# Patient Record
Sex: FEMALE | Race: OTHER | HISPANIC OR LATINO | URBAN - METROPOLITAN AREA
[De-identification: names, ages, dates, MRNs, and addresses within clinical notes are randomized per-mention and may not be internally consistent; named-entity substitution may affect disease eponyms.]

---

## 2019-04-01 PROBLEM — Z00.00 ENCOUNTER FOR PREVENTIVE HEALTH EXAMINATION: Status: ACTIVE | Noted: 2019-04-01

## 2022-10-26 ENCOUNTER — INPATIENT (INPATIENT)
Facility: HOSPITAL | Age: 82
LOS: 6 days | Discharge: HOME HEALTH | DRG: 247 | End: 2022-11-02
Attending: INTERNAL MEDICINE | Admitting: INTERNAL MEDICINE
Payer: MEDICARE

## 2022-10-26 VITALS
DIASTOLIC BLOOD PRESSURE: 77 MMHG | TEMPERATURE: 98 F | SYSTOLIC BLOOD PRESSURE: 144 MMHG | RESPIRATION RATE: 18 BRPM | HEART RATE: 96 BPM | OXYGEN SATURATION: 98 %

## 2022-10-26 LAB
APPEARANCE UR: CLEAR — SIGNIFICANT CHANGE UP
BILIRUB UR-MCNC: NEGATIVE — SIGNIFICANT CHANGE UP
COLOR SPEC: YELLOW — SIGNIFICANT CHANGE UP
DIFF PNL FLD: NEGATIVE — SIGNIFICANT CHANGE UP
GLUCOSE UR QL: NEGATIVE — SIGNIFICANT CHANGE UP
KETONES UR-MCNC: NEGATIVE — SIGNIFICANT CHANGE UP
LEUKOCYTE ESTERASE UR-ACNC: NEGATIVE — SIGNIFICANT CHANGE UP
NITRITE UR-MCNC: NEGATIVE — SIGNIFICANT CHANGE UP
PH UR: 7 — SIGNIFICANT CHANGE UP (ref 5–8)
PROT UR-MCNC: NEGATIVE MG/DL — SIGNIFICANT CHANGE UP
SP GR SPEC: 1.01 — SIGNIFICANT CHANGE UP (ref 1–1.03)
UROBILINOGEN FLD QL: 0.2 E.U./DL — SIGNIFICANT CHANGE UP

## 2022-10-26 PROCEDURE — 71045 X-RAY EXAM CHEST 1 VIEW: CPT | Mod: 26

## 2022-10-26 PROCEDURE — 99285 EMERGENCY DEPT VISIT HI MDM: CPT

## 2022-10-26 PROCEDURE — 93010 ELECTROCARDIOGRAM REPORT: CPT

## 2022-10-26 RX ORDER — ASPIRIN/CALCIUM CARB/MAGNESIUM 324 MG
162 TABLET ORAL ONCE
Refills: 0 | Status: COMPLETED | OUTPATIENT
Start: 2022-10-26 | End: 2022-10-26

## 2022-10-26 RX ORDER — SODIUM CHLORIDE 9 MG/ML
1000 INJECTION INTRAMUSCULAR; INTRAVENOUS; SUBCUTANEOUS ONCE
Refills: 0 | Status: COMPLETED | OUTPATIENT
Start: 2022-10-26 | End: 2022-10-26

## 2022-10-26 RX ADMIN — Medication 162 MILLIGRAM(S): at 23:22

## 2022-10-26 RX ADMIN — SODIUM CHLORIDE 1000 MILLILITER(S): 9 INJECTION INTRAMUSCULAR; INTRAVENOUS; SUBCUTANEOUS at 22:59

## 2022-10-26 NOTE — ED ADULT NURSE NOTE - OBJECTIVE STATEMENT
Pt presents to ER c/o sudden oneset of CP with shortness of breath, palpitations and lightheadedness that started today at 5pm.

## 2022-10-26 NOTE — ED PROVIDER NOTE - CLINICAL SUMMARY MEDICAL DECISION MAKING FREE TEXT BOX
81 yo female with a hx fo HTN, HLD, T2DM p/w chest pain and shortness of breath that started today at 5pm. Tachycardic on arrival. Exam showed b/l edema R>L. EKG RBBB with no prior for comparison (patient says she was never told her EKG is abnormal). Work up for ACS, PE, pneumothorax. No clinical pneumonia. R/o DVT in RLE.

## 2022-10-26 NOTE — ED PROVIDER NOTE - PROGRESS NOTE DETAILS
Trop negative. Age adjusted d-dimer negative. Still having chest pain. Pending XR. XR wet read negative. HEART score 7. Admit to cardiology for unstable angina.

## 2022-10-26 NOTE — ED PROVIDER NOTE - OBJECTIVE STATEMENT
83 yo female with a hx fo HTN, HLD, T2DM p/w chest pain and shortness of breath that started today at 5pm. Says she was feeling well last night. Associated with mild lightheadedness and palpitations. Worse with exertion, midsternal pressure like moderate chest pain without alleviating factors. Never had it before. No fevers, chills, cough, rhinorrhea, congestion, myalgias, abdominal pain, changes in appetite, n/v/d/c, diaphoresis. Reports chronic b/l leg swelling R>L x years but she has been having calf pain on R for the past 2-3 weeks. No hx of DVT/PE. Not on blood thinners. Not on hormone therapy. No hx of active malignancy.

## 2022-10-27 ENCOUNTER — TRANSCRIPTION ENCOUNTER (OUTPATIENT)
Age: 82
End: 2022-10-27

## 2022-10-27 DIAGNOSIS — E87.1 HYPO-OSMOLALITY AND HYPONATREMIA: ICD-10-CM

## 2022-10-27 DIAGNOSIS — E11.9 TYPE 2 DIABETES MELLITUS WITHOUT COMPLICATIONS: ICD-10-CM

## 2022-10-27 DIAGNOSIS — G62.9 POLYNEUROPATHY, UNSPECIFIED: ICD-10-CM

## 2022-10-27 DIAGNOSIS — I20.0 UNSTABLE ANGINA: ICD-10-CM

## 2022-10-27 DIAGNOSIS — Z29.9 ENCOUNTER FOR PROPHYLACTIC MEASURES, UNSPECIFIED: ICD-10-CM

## 2022-10-27 DIAGNOSIS — I10 ESSENTIAL (PRIMARY) HYPERTENSION: ICD-10-CM

## 2022-10-27 DIAGNOSIS — R22.41 LOCALIZED SWELLING, MASS AND LUMP, RIGHT LOWER LIMB: ICD-10-CM

## 2022-10-27 DIAGNOSIS — E78.5 HYPERLIPIDEMIA, UNSPECIFIED: ICD-10-CM

## 2022-10-27 LAB
A1C WITH ESTIMATED AVERAGE GLUCOSE RESULT: 6.7 % — HIGH (ref 4–5.6)
ALBUMIN SERPL ELPH-MCNC: 3.6 G/DL — SIGNIFICANT CHANGE UP (ref 3.3–5)
ALP SERPL-CCNC: 69 U/L — SIGNIFICANT CHANGE UP (ref 40–120)
ALT FLD-CCNC: 29 U/L — SIGNIFICANT CHANGE UP (ref 10–45)
ANION GAP SERPL CALC-SCNC: 12 MMOL/L — SIGNIFICANT CHANGE UP (ref 5–17)
APTT BLD: 30.6 SEC — SIGNIFICANT CHANGE UP (ref 27.5–35.5)
AST SERPL-CCNC: 23 U/L — SIGNIFICANT CHANGE UP (ref 10–40)
BASOPHILS # BLD AUTO: 0.05 K/UL — SIGNIFICANT CHANGE UP (ref 0–0.2)
BASOPHILS NFR BLD AUTO: 0.5 % — SIGNIFICANT CHANGE UP (ref 0–2)
BILIRUB DIRECT SERPL-MCNC: 0.2 MG/DL — SIGNIFICANT CHANGE UP (ref 0–0.3)
BILIRUB INDIRECT FLD-MCNC: 0.3 MG/DL — SIGNIFICANT CHANGE UP (ref 0.2–1)
BILIRUB SERPL-MCNC: 0.5 MG/DL — SIGNIFICANT CHANGE UP (ref 0.2–1.2)
BUN SERPL-MCNC: 4 MG/DL — LOW (ref 7–23)
CALCIUM SERPL-MCNC: 9.7 MG/DL — SIGNIFICANT CHANGE UP (ref 8.4–10.5)
CHLORIDE SERPL-SCNC: 92 MMOL/L — LOW (ref 96–108)
CHOLEST SERPL-MCNC: 111 MG/DL — SIGNIFICANT CHANGE UP
CK MB CFR SERPL CALC: 1.9 NG/ML — SIGNIFICANT CHANGE UP (ref 0–6.7)
CK MB CFR SERPL CALC: 2 NG/ML — SIGNIFICANT CHANGE UP (ref 0–6.7)
CK SERPL-CCNC: 42 U/L — SIGNIFICANT CHANGE UP (ref 25–170)
CK SERPL-CCNC: 47 U/L — SIGNIFICANT CHANGE UP (ref 25–170)
CO2 SERPL-SCNC: 26 MMOL/L — SIGNIFICANT CHANGE UP (ref 22–31)
CREAT SERPL-MCNC: 0.41 MG/DL — LOW (ref 0.5–1.3)
EGFR: 99 ML/MIN/1.73M2 — SIGNIFICANT CHANGE UP
EOSINOPHIL # BLD AUTO: 0.07 K/UL — SIGNIFICANT CHANGE UP (ref 0–0.5)
EOSINOPHIL NFR BLD AUTO: 0.8 % — SIGNIFICANT CHANGE UP (ref 0–6)
ESTIMATED AVERAGE GLUCOSE: 146 MG/DL — HIGH (ref 68–114)
GLUCOSE BLDC GLUCOMTR-MCNC: 150 MG/DL — HIGH (ref 70–99)
GLUCOSE BLDC GLUCOMTR-MCNC: 153 MG/DL — HIGH (ref 70–99)
GLUCOSE BLDC GLUCOMTR-MCNC: 158 MG/DL — HIGH (ref 70–99)
GLUCOSE BLDC GLUCOMTR-MCNC: 185 MG/DL — HIGH (ref 70–99)
GLUCOSE SERPL-MCNC: 195 MG/DL — HIGH (ref 70–99)
HCT VFR BLD CALC: 36.3 % — SIGNIFICANT CHANGE UP (ref 34.5–45)
HDLC SERPL-MCNC: 48 MG/DL — LOW
HGB BLD-MCNC: 12.5 G/DL — SIGNIFICANT CHANGE UP (ref 11.5–15.5)
IMM GRANULOCYTES NFR BLD AUTO: 1 % — HIGH (ref 0–0.9)
INR BLD: 1.04 — SIGNIFICANT CHANGE UP (ref 0.88–1.16)
LIPID PNL WITH DIRECT LDL SERPL: 50 MG/DL — SIGNIFICANT CHANGE UP
LYMPHOCYTES # BLD AUTO: 1.95 K/UL — SIGNIFICANT CHANGE UP (ref 1–3.3)
LYMPHOCYTES # BLD AUTO: 21.4 % — SIGNIFICANT CHANGE UP (ref 13–44)
MAGNESIUM SERPL-MCNC: 1.5 MG/DL — LOW (ref 1.6–2.6)
MCHC RBC-ENTMCNC: 28.6 PG — SIGNIFICANT CHANGE UP (ref 27–34)
MCHC RBC-ENTMCNC: 34.4 GM/DL — SIGNIFICANT CHANGE UP (ref 32–36)
MCV RBC AUTO: 83.1 FL — SIGNIFICANT CHANGE UP (ref 80–100)
MONOCYTES # BLD AUTO: 0.85 K/UL — SIGNIFICANT CHANGE UP (ref 0–0.9)
MONOCYTES NFR BLD AUTO: 9.3 % — SIGNIFICANT CHANGE UP (ref 2–14)
NEUTROPHILS # BLD AUTO: 6.12 K/UL — SIGNIFICANT CHANGE UP (ref 1.8–7.4)
NEUTROPHILS NFR BLD AUTO: 67 % — SIGNIFICANT CHANGE UP (ref 43–77)
NON HDL CHOLESTEROL: 63 MG/DL — SIGNIFICANT CHANGE UP
NRBC # BLD: 0 /100 WBCS — SIGNIFICANT CHANGE UP (ref 0–0)
PLATELET # BLD AUTO: 308 K/UL — SIGNIFICANT CHANGE UP (ref 150–400)
POTASSIUM SERPL-MCNC: 3.8 MMOL/L — SIGNIFICANT CHANGE UP (ref 3.5–5.3)
POTASSIUM SERPL-SCNC: 3.8 MMOL/L — SIGNIFICANT CHANGE UP (ref 3.5–5.3)
PROT SERPL-MCNC: 7.4 G/DL — SIGNIFICANT CHANGE UP (ref 6–8.3)
PROTHROM AB SERPL-ACNC: 12.4 SEC — SIGNIFICANT CHANGE UP (ref 10.5–13.4)
RBC # BLD: 4.37 M/UL — SIGNIFICANT CHANGE UP (ref 3.8–5.2)
RBC # FLD: 13.4 % — SIGNIFICANT CHANGE UP (ref 10.3–14.5)
SODIUM SERPL-SCNC: 130 MMOL/L — LOW (ref 135–145)
TRIGL SERPL-MCNC: 64 MG/DL — SIGNIFICANT CHANGE UP
TROPONIN T SERPL-MCNC: 0.01 NG/ML — SIGNIFICANT CHANGE UP (ref 0–0.01)
TROPONIN T SERPL-MCNC: <0.01 NG/ML — SIGNIFICANT CHANGE UP (ref 0–0.01)
TSH SERPL-MCNC: 0.74 UIU/ML — SIGNIFICANT CHANGE UP (ref 0.27–4.2)
WBC # BLD: 9.13 K/UL — SIGNIFICANT CHANGE UP (ref 3.8–10.5)
WBC # FLD AUTO: 9.13 K/UL — SIGNIFICANT CHANGE UP (ref 3.8–10.5)

## 2022-10-27 PROCEDURE — 93306 TTE W/DOPPLER COMPLETE: CPT | Mod: 26

## 2022-10-27 PROCEDURE — 93970 EXTREMITY STUDY: CPT | Mod: 26

## 2022-10-27 PROCEDURE — 99222 1ST HOSP IP/OBS MODERATE 55: CPT

## 2022-10-27 RX ORDER — GLUCAGON INJECTION, SOLUTION 0.5 MG/.1ML
1 INJECTION, SOLUTION SUBCUTANEOUS ONCE
Refills: 0 | Status: DISCONTINUED | OUTPATIENT
Start: 2022-10-27 | End: 2022-11-02

## 2022-10-27 RX ORDER — DEXTROSE 50 % IN WATER 50 %
12.5 SYRINGE (ML) INTRAVENOUS ONCE
Refills: 0 | Status: DISCONTINUED | OUTPATIENT
Start: 2022-10-27 | End: 2022-11-02

## 2022-10-27 RX ORDER — MAGNESIUM OXIDE 400 MG ORAL TABLET 241.3 MG
400 TABLET ORAL ONCE
Refills: 0 | Status: COMPLETED | OUTPATIENT
Start: 2022-10-27 | End: 2022-10-27

## 2022-10-27 RX ORDER — DEXTROSE 50 % IN WATER 50 %
15 SYRINGE (ML) INTRAVENOUS ONCE
Refills: 0 | Status: DISCONTINUED | OUTPATIENT
Start: 2022-10-27 | End: 2022-11-02

## 2022-10-27 RX ORDER — SODIUM CHLORIDE 9 MG/ML
1000 INJECTION, SOLUTION INTRAVENOUS
Refills: 0 | Status: DISCONTINUED | OUTPATIENT
Start: 2022-10-27 | End: 2022-11-02

## 2022-10-27 RX ORDER — AMLODIPINE BESYLATE 2.5 MG/1
5 TABLET ORAL DAILY
Refills: 0 | Status: DISCONTINUED | OUTPATIENT
Start: 2022-10-27 | End: 2022-11-02

## 2022-10-27 RX ORDER — DEXTROSE 50 % IN WATER 50 %
25 SYRINGE (ML) INTRAVENOUS ONCE
Refills: 0 | Status: DISCONTINUED | OUTPATIENT
Start: 2022-10-27 | End: 2022-11-02

## 2022-10-27 RX ORDER — POTASSIUM CHLORIDE 20 MEQ
20 PACKET (EA) ORAL ONCE
Refills: 0 | Status: COMPLETED | OUTPATIENT
Start: 2022-10-27 | End: 2022-10-27

## 2022-10-27 RX ORDER — METFORMIN HYDROCHLORIDE 850 MG/1
1 TABLET ORAL
Qty: 0 | Refills: 0 | DISCHARGE

## 2022-10-27 RX ORDER — INSULIN LISPRO 100/ML
VIAL (ML) SUBCUTANEOUS
Refills: 0 | Status: DISCONTINUED | OUTPATIENT
Start: 2022-10-27 | End: 2022-11-02

## 2022-10-27 RX ORDER — METOPROLOL TARTRATE 50 MG
25 TABLET ORAL DAILY
Refills: 0 | Status: DISCONTINUED | OUTPATIENT
Start: 2022-10-27 | End: 2022-11-02

## 2022-10-27 RX ORDER — GLIMEPIRIDE 1 MG
1 TABLET ORAL
Qty: 0 | Refills: 0 | DISCHARGE

## 2022-10-27 RX ORDER — ATORVASTATIN CALCIUM 80 MG/1
20 TABLET, FILM COATED ORAL AT BEDTIME
Refills: 0 | Status: DISCONTINUED | OUTPATIENT
Start: 2022-10-27 | End: 2022-11-01

## 2022-10-27 RX ORDER — HYDROCHLOROTHIAZIDE 25 MG
12.5 TABLET ORAL DAILY
Refills: 0 | Status: DISCONTINUED | OUTPATIENT
Start: 2022-10-27 | End: 2022-10-29

## 2022-10-27 RX ORDER — INFLUENZA VIRUS VACCINE 15; 15; 15; 15 UG/.5ML; UG/.5ML; UG/.5ML; UG/.5ML
0.7 SUSPENSION INTRAMUSCULAR ONCE
Refills: 0 | Status: DISCONTINUED | OUTPATIENT
Start: 2022-10-27 | End: 2022-11-02

## 2022-10-27 RX ORDER — ASPIRIN/CALCIUM CARB/MAGNESIUM 324 MG
81 TABLET ORAL DAILY
Refills: 0 | Status: DISCONTINUED | OUTPATIENT
Start: 2022-10-27 | End: 2022-11-02

## 2022-10-27 RX ORDER — METOPROLOL TARTRATE 50 MG
50 TABLET ORAL ONCE
Refills: 0 | Status: COMPLETED | OUTPATIENT
Start: 2022-10-27 | End: 2022-10-27

## 2022-10-27 RX ORDER — LOSARTAN POTASSIUM 100 MG/1
100 TABLET, FILM COATED ORAL DAILY
Refills: 0 | Status: DISCONTINUED | OUTPATIENT
Start: 2022-10-27 | End: 2022-11-02

## 2022-10-27 RX ADMIN — Medication 2: at 07:04

## 2022-10-27 RX ADMIN — MAGNESIUM OXIDE 400 MG ORAL TABLET 400 MILLIGRAM(S): 241.3 TABLET ORAL at 18:30

## 2022-10-27 RX ADMIN — AMLODIPINE BESYLATE 5 MILLIGRAM(S): 2.5 TABLET ORAL at 06:33

## 2022-10-27 RX ADMIN — Medication 2: at 23:18

## 2022-10-27 RX ADMIN — Medication 2: at 16:25

## 2022-10-27 RX ADMIN — Medication 12.5 MILLIGRAM(S): at 06:58

## 2022-10-27 RX ADMIN — Medication 25 MILLIGRAM(S): at 06:34

## 2022-10-27 RX ADMIN — Medication 20 MILLIEQUIVALENT(S): at 18:30

## 2022-10-27 RX ADMIN — Medication 81 MILLIGRAM(S): at 14:00

## 2022-10-27 RX ADMIN — LOSARTAN POTASSIUM 100 MILLIGRAM(S): 100 TABLET, FILM COATED ORAL at 06:33

## 2022-10-27 NOTE — H&P ADULT - NSICDXPASTMEDICALHX_GEN_ALL_CORE_FT
PAST MEDICAL HISTORY:  DM2 (diabetes mellitus, type 2)     Essential hypertension     Hyperlipidemia      PAST MEDICAL HISTORY:  DM2 (diabetes mellitus, type 2)     Essential hypertension     H/O peripheral neuropathy     Hyperlipidemia

## 2022-10-27 NOTE — H&P ADULT - NSHPPHYSICALEXAM_GEN_ALL_CORE
Temp 98.6F, HR 70bpm, /80, RR 16, O2 sat 98% RA, Weight 200lbs, Height 5' 6"    T(C): 36.8 (10-26-22 @ 20:46), Max: 36.8 (10-26-22 @ 20:46)  HR: 110 (10-26-22 @ 22:13) (96 - 110)  BP: 144/77 (10-26-22 @ 20:46) (144/77 - 144/77)  RR: 18 (10-26-22 @ 22:13) (18 - 18)  SpO2: 96% (10-26-22 @ 22:13) (96% - 98%)  Wt(kg): --    Appearance: Normal	  HEENT:   Normal oral mucosa, PERRL, EOMI	  Neck: Supple,  - JVD; No Carotid Bruit and 2+ pulses B/L  Cardiovascular: Normal S1 S2, No JVD, No murmurs  Respiratory: Lungs clear to auscultation/No Rales, Rhonchi, Wheezing	  Gastrointestinal:  Soft, Non-tender, + BS	  Skin: No rashes, No ecchymoses, No cyanosis  Extremities: Normal range of motion, No clubbing, cyanosis or  b/l edema R>L  Vascular: Femoral pulses 2+ b/l without bruit, DP 1+ b/l, PT 1+ b/l  Neurologic: Non-focal  Psychiatry: A & O x 3, Mood & affect appropriate T(C): 36.8 (10-26-22 @ 20:46), Max: 36.8 (10-26-22 @ 20:46)  HR: 110 (10-26-22 @ 22:13) (96 - 110)  BP: 144/77 (10-26-22 @ 20:46) (144/77 - 144/77)  RR: 18 (10-26-22 @ 22:13) (18 - 18)  SpO2: 96% (10-26-22 @ 22:13) (96% - 98%)  Wt(kg): --    Appearance: Normal	  HEENT:   Normal oral mucosa, PERRL, EOMI	  Neck: Supple,  - JVD; No Carotid Bruit and 2+ pulses B/L  Cardiovascular: Normal S1 S2, No JVD, No murmurs  Respiratory: Lungs mild crackles at left base; otherwise CTA	  Gastrointestinal:  Soft, Non-tender, + BS	  Skin: No rashes, No ecchymoses, No cyanosis  Extremities: Normal range of motion, No clubbing, cyanosis or  b/l edema R>L +1-2 pitting edema  Vascular: Femoral pulses 2+ b/l without bruit, DP 2+ b/l, PT 1+ b/l  Neurologic: Non-focal  Psychiatry: A & O x 3, Mood & affect appropriate

## 2022-10-27 NOTE — H&P ADULT - NS ATTEND AMEND GEN_ALL_CORE FT
Initial attending contact date 10.27.22 on morning bedside rounds. See attending addendum to HPI here for initial attending contact documentation.  82F with hx DM2, HLD, HTN p/w chest pain, palpitations. ECG RBBB. D dimer was negative. Serial troponin negative. Admit for ACS r/o  Patient initially amenable for NST today 10/27, however upon being taken for examination patient declined to proceed with stress testing. Patient is amenable to CCTA which can be done tomorrow.  TTE performd 10/27 normal findings  Plan for:  NPO pMN for CCTA 10/28  Cont home meds:  Losartan 100, HCTZ 25, Amlodipine 5 daily for BP control  Toprol 25XL daily  Atorva 20 qHS  PT evaluation of deconditioning  Christiana Corrigan M.D.  Cardiology Attending  65 minutes spent on total encounter; more than 50% of the visit was spent counseling and/or coordinating care by the attending physician, with plan of care discussed with the patient and cardiac team.

## 2022-10-27 NOTE — DISCHARGE NOTE PROVIDER - NSDCCPCAREPLAN_GEN_ALL_CORE_FT
PRINCIPAL DISCHARGE DIAGNOSIS  Diagnosis: Unstable angina  Assessment and Plan of Treatment: You were found to have blockages in the arteries of your heart, also known as Coronary Artery Disease. You underwent a cardiac catheterization on 11/1 and received a stent to the Obtuse marginal artery.  PLEASE CONTINUE ASPIRIN 81MG DAILY AND PLAVIX 75MG DAILY. DO NOT STOP THESE MEDICATIONS FOR ANY REASON AS THEY ARE KEEPING YOUR STENT OPEN AND PREVENTING A HEART ATTACK.   Avoid strenuous activity or heavy lifting anything more than 5lbs for the next five days. Do not take a bath or swim for the next five days; you may shower. For any bleeding or hematoma formation (hardened blood collection under the skin) at the access site of your keene wrist please hold pressure and go to the emergency room. Please follow up with  ___ in 1-2 weeks. For recurrent chest pain, please call your doctor or go to the emergency room.   - We have provided you with a prescription for cardiac rehab which is a medically supervised exercise program for your heart and has been shown to improve the quantity and quality of life of people with heart disease like yours.   - You should attend cardiac rehab 3 times per week for 12 weeks.   - We have provided you with a list of nearby facilities.   -Please call your insurance carrier to determine which of these facilities are covered under your plan.   - Please bring this prescription with you to your follow up appointment with your cardiologist who can then further assist you to enroll into a cardiac rehab program.       PRINCIPAL DISCHARGE DIAGNOSIS  Diagnosis: Unstable angina  Assessment and Plan of Treatment: You were found to have blockages in the arteries of your heart, also known as Coronary Artery Disease. You underwent a cardiac catheterization on 11/1 and received a stent to the Obtuse marginal artery.  PLEASE CONTINUE ASPIRIN 81MG DAILY AND PLAVIX 75MG DAILY. DO NOT STOP THESE MEDICATIONS FOR ANY REASON AS THEY ARE KEEPING YOUR STENT OPEN AND PREVENTING A HEART ATTACK.   Avoid strenuous activity or heavy lifting anything more than 5lbs for the next five days. Do not take a bath or swim for the next five days; you may shower. For any bleeding or hematoma formation (hardened blood collection under the skin) at the access site of your keene wrist please hold pressure and go to the emergency room. Please follow up with  ___ in 1-2 weeks. For recurrent chest pain, please call your doctor or go to the emergency room.       PRINCIPAL DISCHARGE DIAGNOSIS  Diagnosis: Unstable angina  Assessment and Plan of Treatment: You were found to have blockages in the arteries of your heart, also known as Coronary Artery Disease. You underwent a cardiac catheterization on 11/1 and received a stent to the Obtuse marginal artery.  PLEASE CONTINUE ASPIRIN 81MG DAILY AND PLAVIX 75MG DAILY. DO NOT STOP THESE MEDICATIONS FOR ANY REASON AS THEY ARE KEEPING YOUR STENT OPEN AND PREVENTING A HEART ATTACK.   Avoid strenuous activity or heavy lifting anything more than 5lbs for the next five days. Do not take a bath or swim for the next five days; you may shower. For any bleeding or hematoma formation (hardened blood collection under the skin) at the access site of your right wrist please hold pressure and go to the emergency room. Please follow up with Dr. Berry in 1-2 weeks. For recurrent chest pain, please call your doctor or go to the emergency room.      SECONDARY DISCHARGE DIAGNOSES  Diagnosis: Essential hypertension  Assessment and Plan of Treatment: Please continue Losartan 100mg daily, Metoprolol Succinate ER 25mg daily, and Amlodipine has been increased to 10mg daily to keep your blood pressure controlled. For blood pressure that is too high or too low please see your doctor or go to the emergency room as necessary.      Diagnosis: DM2 (diabetes mellitus, type 2)  Assessment and Plan of Treatment: Your Hemoglobin A1c is 6.7 and your goal A1c is less than 7.0%. This number measures your average blood sugar level over the last three months.  DO NOT TAKE your Metformin for two days and resume on 11/4/2022. This medication can interact with the contrast used during your procedure therefore we want to ensure the contrast has left your body prior to you restarting your Metformin.   Make sure you monitor your finger sticks and diet while you are not taking your Metformin!      Diagnosis: Hyperlipidemia  Assessment and Plan of Treatment: Your cholesterol panel is abnormal. Your LDL is (INSERT) mg/dL and your goal LDL is less than 70 mg/dL. LDL is also known as "bad cholesterol" because it takes cholesterol to your arteries, where it may collect in artery walls. Too much cholesterol in your arteries may lead to a buildup of plaque known as atherosclerosis and can cause heart disease. Your HDL is (INSERT) mg/dL and your goal HDL is greater than 50 mg/dL. The higher the HDL the better; HDL is known as “good cholesterol” because it transports cholesterol to your liver to be expelled from your body.    Diagnosis: Phlebitis  Assessment and Plan of Treatment: During your hospital course you had a fever and White blood count (monitor infections) was mildly elevated and infectious work up was negative. Please follow up with your primary care doctor for repeat blood work.

## 2022-10-27 NOTE — H&P ADULT - HISTORY OF PRESENT ILLNESS
A&O X3 Full Code A&O X3 Full Code  Daughter-in-law at bedside interpreting, reliable source    83 y/o Welsh speaking female, who ambulates with walker, POOR HISTORIAN with PMHX of HTN, HLD, DM2,and  Peripheral Neuropathy presents to [facility] ER this evening, 10/26/22, accompanied by son and daugher-in-law complaining of intermittent, non exertional, non radiating, midsternal chest pressure  associated with weakness, lightheadedness, SOB and palpitations that began at 5PM this evening.   Pt. also endorses R>L LE edema with right leg "heaviness" with mild calf pain for one month.    In speaking with patient, she reports sudden intermittent, non exertional, non  radiating midsternal chest pressure occurring around 5PM while sitting down and worsening with ambulation.  She describes chest pressure as "heavy", rated 6/10, lasting 2-3 minutes associated with weakness, SOB and palpitations.   Pt. states symptoms are worse with exertion and without alleviating factors.  According to patient, she was feeling well last night and denies similar  symptoms in the past.    She also endorses difficulty walking for the past month 2/2 to right leg heaviness with b/l LE R>L swelling.   Pt. denies cardiac hx, recent stress test and TTE.    No recent travel, fever, chills, cough HA, orthopnea, dizziness, diaphoresis, abdominal discomfort and n/v/d.      In ER ECG reveals Sinus Tachy HR@ 105bpm with PAC's , RBBB and non specific ST-T wave changes,  CE neg X2, , D-dimer 318, Blood Glucose 221, BUN/Cr 4/0.44 (GFR 98) COVID-19 non detected and CXR AP no acute pathology seen.    In light of patient's risk factors, and symptoms (UA) pt. is admitted to [facility], [address] for further cardiac workup to rule out ACS, with TTE and b/l LE Venous US to rule out DVT.

## 2022-10-27 NOTE — H&P ADULT - PROBLEM SELECTOR PLAN 5
F/U Hgb A1C  -Hold patient's oral hyperglycemic medications Metformin 850mg PO bid and Glimepride 2mg PO daily  -Continue Insulin Lispro Med dose sliding scale

## 2022-10-27 NOTE — PROGRESS NOTE ADULT - ASSESSMENT
81 y/o Maltese speaking female, who ambulates with walker, POOR HISTORIAN with PMHX of HTN, HLD, DM2,and  Peripheral Neuropathy presents to [facility] ER 10/26/22, accompanied by son and daugher-in-law complaining of intermittent, non exertional, non radiating, midsternal chest pressure associated with weakness, lightheadedness, SOB and palpitations. Pt is admitted to tele/stepdown to r/o ACS.

## 2022-10-27 NOTE — DISCHARGE NOTE PROVIDER - HOSPITAL COURSE
INCOMPLETE 83 y/o French speaking female, who ambulates with walker, POOR HISTORIAN with PMHX of HTN, HLD, DM2,and  Peripheral Neuropathy presents to [facility] ER this evening, 10/26/22, accompanied by son and daugher-in-law complaining of intermittent, non exertional, non radiating, midsternal chest pressure  associated with weakness, lightheadedness, SOB and palpitations that began at 5PM this evening.   Pt. also endorses R>L LE edema with right leg "heaviness" with mild calf pain for one month. In speaking with patient, she reports sudden intermittent, non exertional, non  radiating midsternal chest pressure occurring around 5PM while sitting down and worsening with ambulation.  She describes chest pressure as "heavy", rated 6/10, lasting 2-3 minutes associated with weakness, SOB and palpitations.   Pt. states symptoms are worse with exertion and without alleviating factors.  According to patient, she was feeling well last night and denies similar  symptoms in the past.    She also endorses difficulty walking for the past month 2/2 to right leg heaviness with b/l LE R>L swelling. No recent travel, fever, chills, cough HA, orthopnea, dizziness, diaphoresis, abdominal discomfort and n/v/d.  In ER ECG reveals Sinus Tachy HR@ 105bpm with PAC's RBBB and non specific ST-T wave changes,  CE neg X2, , D-dimer 318, Blood Glucose 221, BUN/Cr 4/0.44 (GFR 98) COVID-19 non detected and CXR AP no acute pathology seen. .Pt was admitted to [facility], [facility] for further cardiac workup to rule out ACS, with TTE and b/l LE Venous US to rule out DVT.    Pt was admitted to [facility] and underwent ischemic workup with CCTA 10/28 showing The calcium score is severe at 660 Agatston units, which is at the 84 percentile, adjusted for age, gender and race. 2. Moderate to severe OM1 stenosis due to calcific and non-calcific plaque 3. Probably nonobstructive RPDA showing Nonobstructive coronary artery disease in remaining segments. ECHO was done 10/27 LVEF Normal right ventricular size and systolic function. Aortic sclerosis without significant stenosis.  No evidence of pulmonary hypertension. pericardial effusion No prior echo is available for comparison. On 10/27 Patient underwent Duplex US of LE with no evidence of DVT.  Patient then was planned for cardiac cath on 10/31 however on morning of cath patient spiked a fever and full set of labs were done with BCX negative to date, WBC WNL, UA negative and with most likely cause phlebitis due to IV. Patient therefore underwent cath on 11/1/2022 with Sneha Vanessa/ MINE CAMACHO TR Access 1vCAD with OM1 80% --> s/p CHEIKH x 1 EDP 12, nl EF.     Access: R radial. Pt was admitted overnight to [facility] for monitoring and has been seen and examined at bedside this morning. Pt is out of bed and ambulating with no complaints. R radial access stable with no hematoma, no bleed, distal pulse to baseline. Lab values, telemetry, and vital signs reviewed and remained stable. Discharge medication regimen reviewed with patient and Dr. Lawson. Pt will continue aspirin 81mg and Plavix 75 daily, _____. All discharge instructions reviewed with patient and medications e-prescribed to pharmacy. Pt is cleared for discharge per Dr. Lawson, and will follow up with ---- within 1-2 weeks of discharge.          83 y/o German speaking female, who ambulates with walker, POOR HISTORIAN with PMHx of HTN, HLD, DM2,and  Peripheral Neuropathy presents to [facility] ER on 10/26/22, accompanied by son and daugher-in-law complaining of intermittent, non exertional, non radiating, midsternal chest pressure  associated with weakness, lightheadedness, SOB and palpitations that began around  5PM.  Pt. also endorses R>L LE edema with right leg "heaviness" with mild calf pain for one month. In speaking with patient, she reported chest pressure occurring around 5PM while sitting down and worsening with ambulation.  She describes chest pressure as "heavy", rated 6/10, lasting 2-3 minutes associated with weakness, SOB and palpitations. Pt. stated symptoms worsen with exertion and without alleviating factors.  According to patient, she was feeling well last night and denies similar  symptoms in the past.    She also endorsed+ difficulty walking for the past month 2/2 to right leg heaviness with b/l LE R>L swelling. No recent travel, fever, chills, cough HA, orthopnea, dizziness, diaphoresis, abdominal discomfort and n/v/d.  In ER ECG reveals Sinus Tachy HR@ 105bpm with PAC's RBBB and non specific ST-T wave changes,  CE neg X2, , D-dimer 318, Blood Glucose 221, BUN/Cr 4/0.44 (GFR 98) COVID-19 non detected and CXR AP no acute pathology seen. .Pt was admitted to [facility] for further cardiac workup to rule out ACS, with TTE and b/l LE Venous US to rule out DVT.    Pt was admitted to [facility] and underwent ischemic workup with CCTA 10/28 showing The calcium score is severe at 660 Agatston units, which is at the 84 percentile, adjusted for age, gender and race. 2. Moderate to severe OM1 stenosis due to calcific and non-calcific plaque 3. Probably nonobstructive RPDA showing Nonobstructive coronary artery disease in remaining segments. ECHO was done 10/27 LVEF Normal right ventricular size and systolic function. Aortic sclerosis without significant stenosis.  No evidence of pulmonary hypertension. pericardial effusion No prior echo is available for comparison. On 10/27 Patient underwent Duplex US of LE with no evidence of DVT.  Patient then was planned for cardiac cath on 10/31 however on morning of cath patient spiked a fever and full set of labs were done with BCX negative to date, WBC WNL, UA negative and with most likely cause phlebitis due to IV. Patient therefore underwent cath on 11/1/2022 with Sneha Vanessa/ MINE CAMACHO TR Access 1vCAD with OM1 80% --> s/p CHEIKH x 1 EDP 12, nl EF.     Access: R radial. Pt was admitted overnight to [facility] for monitoring and has been seen and examined at bedside this morning. Pt is out of bed and ambulating with no complaints. R radial access stable with no hematoma, no bleed, distal pulse to baseline. Lab values, telemetry, and vital signs reviewed and remained stable. Discharge medication regimen reviewed with patient and Dr. Lawson. Pt will continue aspirin 81mg and Plavix 75 daily, _____. All discharge instructions reviewed with patient and medications e-prescribed to pharmacy. Pt is cleared for discharge per Dr. Lawson, and will follow up with ---- within 1-2 weeks of discharge.          81 y/o Wolof speaking female, who ambulates with walker, POOR HISTORIAN with PMHx of HTN, HLD, DM2,and  Peripheral Neuropathy presents to [facility] ER on 10/26/22, accompanied by son and daugher-in-law complaining of intermittent, non exertional, non radiating, midsternal chest pressure  associated with weakness, lightheadedness, SOB and palpitations that began around  5PM.  Pt. also endorses R>L LE edema with right leg "heaviness" with mild calf pain for one month. In speaking with patient, she reported chest pressure occurring around 5PM while sitting down and worsening with ambulation.  She describes chest pressure as "heavy", rated 6/10, lasting 2-3 minutes associated with weakness, SOB and palpitations. Pt. stated symptoms worsen with exertion and without alleviating factors.  According to patient, she was feeling well last night and denies similar  symptoms in the past.  She also endorsed+ difficulty walking for the past month 2/2 to right leg heaviness with b/l LE R>L swelling. No recent travel, fever, chills, cough HA, orthopnea, dizziness, diaphoresis, abdominal discomfort and n/v/d.  In ER ECG reveals Sinus Tachy HR@ 105bpm with PAC's RBBB and non specific ST-T wave changes,  CE neg X2, , D-dimer 318, Blood Glucose 221, BUN/Cr 4/0.44 (GFR 98) COVID-19 non detected and CXR AP no acute pathology seen. .Pt was admitted to [facility] for further cardiac workup to rule out ACS, with TTE and b/l LE Venous US to rule out DVT.    Pt was admitted to [facility] and underwent ischemic workup with CCTA 10/28 showing The calcium score is severe at 660 Agatston units, which is at the 84 percentile, adjusted for age, gender and race. 2. Moderate to severe OM1 stenosis due to calcific and non-calcific plaque 3. Probably nonobstructive RPDA showing Nonobstructive coronary artery disease in remaining segments. ECHO was done 10/27 LVEF Normal right ventricular size and systolic function. Aortic sclerosis without significant stenosis.  No evidence of pulmonary hypertension. pericardial effusion No prior echo is available for comparison. On 10/27 Patient underwent Duplex US of LE with no evidence of DVT.  Patient then was planned for cardiac cath on 10/31 however on morning of cath patient spiked a fever and full set of labs were done with BCX negative to date, WBC WNL, UA negative and with most likely cause phlebitis due to IV. Patient therefore underwent cath on 11/1/2022 with Sneha Vanessa/ IC Fellow Luigi, 1vCAD with OM1 80% --> s/p CHEIKH x 1 EDP 12, nl EF. Access site right radial, TR band removed without any complications, no hematoma or bleeding.      Pt was admitted overnight to [facility] for monitoring and has been seen and examined at bedside this morning. No event Pt is out of bed and ambulating with no complaints. R radial access stable with no hematoma, no bleed, distal pulse to baseline. Lab values, telemetry, and vital signs reviewed and remained stable. Discharge medication regimen reviewed with patient and Dr. Lawson. Pt will continue aspirin 81mg and Plavix 75 daily, _____. All discharge instructions reviewed with patient and medications e-prescribed to pharmacy. Pt is cleared for discharge per Dr. Lawson, and will follow up with ---- within 1-2 weeks of discharge.          81 y/o Georgian speaking female, who ambulates with walker, POOR HISTORIAN with PMHx of HTN, HLD, DM2,and  Peripheral Neuropathy presents to [facility] ER on 10/26/22, accompanied by son and daugher-in-law complaining of intermittent, non exertional, non radiating, midsternal chest pressure  associated with weakness, lightheadedness, SOB and palpitations that began around  5PM.  Pt. also endorses R>L LE edema with right leg "heaviness" with mild calf pain for one month. In speaking with patient, she reported chest pressure occurring around 5PM while sitting down and worsening with ambulation.  She describes chest pressure as "heavy", rated 6/10, lasting 2-3 minutes associated with weakness, SOB and palpitations. Pt. stated symptoms worsen with exertion and without alleviating factors.  According to patient, she was feeling well last night and denies similar  symptoms in the past.  She also endorsed+ difficulty walking for the past month 2/2 to right leg heaviness with b/l LE R>L swelling. No recent travel, fever, chills, cough HA, orthopnea, dizziness, diaphoresis, abdominal discomfort and n/v/d.  In ER ECG reveals Sinus Tachy HR@ 105bpm with PAC's RBBB and non specific ST-T wave changes,  CE neg X2, , D-dimer 318, Blood Glucose 221, BUN/Cr 4/0.44 (GFR 98) COVID-19 non detected and CXR AP no acute pathology seen. .Pt was admitted to [facility] for further cardiac workup to rule out ACS, with TTE and b/l LE Venous US to rule out DVT.    Pt was admitted to [facility] and underwent ischemic workup with CCTA 10/28 showing The calcium score is severe at 660 Agatston units, which is at the 84 percentile, adjusted for age, gender and race. 2. Moderate to severe OM1 stenosis due to calcific and non-calcific plaque 3. Probably nonobstructive RPDA showing Nonobstructive coronary artery disease in remaining segments. ECHO was done 10/27 LVEF Normal right ventricular size and systolic function. Aortic sclerosis without significant stenosis.  No evidence of pulmonary hypertension. pericardial effusion No prior echo is available for comparison. On 10/27 Patient underwent Duplex US of LE with no evidence of DVT.  Patient then was planned for cardiac cath on 10/31 however on morning of cath patient spiked a fever and full set of labs were done with BCX negative to date, WBC WNL, UA negative and with most likely cause phlebitis due to IV. Patient therefore underwent cath on 11/1/2022 with Sneha Vanessa/ IC Fellow Luigi, 1vCAD with OM1 80% --> s/p CHEIKH x 1 EDP 12, nl EF. Access site right radial, TR band removed without any complications, no hematoma or bleeding.      Pt was admitted overnight to [facility] for monitoring and has been seen and examined at bedside this morning. Overnight pt's -180s s/p Lopressor 25mg  PO x 1 and Amlodipine 5mg PO x 1 with improvement. Pt is out of bed and ambulating with no complaints. R radial access stable with no hematoma, no bleed, distal pulse to baseline. Lab values (WBC bumped up from 10.95k from 9.50k, afebrile, most likely post cath will repeat lab work as outpt), telemetry, and vital signs reviewed and remained stable. Discharge medication regimen reviewed with patient and Dr. Lawson. Pt will continue aspirin 81mg and Plavix 75 daily, Amlodipine 10mg daily, Losartan 100mg daily, Metoprolol Succinate 25mg daily, Atorvastatin 40mg qhs.  All discharge instructions reviewed with patient and medications e-prescribed to pharmacy. Pt is cleared for discharge per Dr. Lawson, and will follow up with   within 1-2 weeks of discharge.     VS Stable  Gen: NAD, A&O x3  Cards: RRR, clear S1 and S2 without murmur  Pulm: CTA B/L without w/r/r  Right Radial: No hematoma or ooze, peripheral pulses 2+ B/L  Abd: soft, NT  Ext: no LE edema or ulcerations B/L           81 y/o Kiswahili speaking female, who ambulates with walker, POOR HISTORIAN with PMHx of HTN, HLD, DM2,and  Peripheral Neuropathy presents to [facility] ER on 10/26/22, accompanied by son and daugher-in-law complaining of intermittent, non exertional, non radiating, midsternal chest pressure  associated with weakness, lightheadedness, SOB and palpitations that began around  5PM.  Pt. also endorses R>L LE edema with right leg "heaviness" with mild calf pain for one month. In speaking with patient, she reported chest pressure occurring around 5PM while sitting down and worsening with ambulation.  She describes chest pressure as "heavy", rated 6/10, lasting 2-3 minutes associated with weakness, SOB and palpitations. Pt. stated symptoms worsen with exertion and without alleviating factors.  According to patient, she was feeling well last night and denies similar  symptoms in the past.  She also endorsed+ difficulty walking for the past month 2/2 to right leg heaviness with b/l LE R>L swelling. No recent travel, fever, chills, cough HA, orthopnea, dizziness, diaphoresis, abdominal discomfort and n/v/d.  In ER ECG reveals Sinus Tachy HR@ 105bpm with PAC's RBBB and non specific ST-T wave changes,  CE neg X2, , D-dimer 318, Blood Glucose 221, BUN/Cr 4/0.44 (GFR 98) COVID-19 non detected and CXR AP no acute pathology seen. .Pt was admitted to [facility] for further cardiac workup to rule out ACS, with TTE and b/l LE Venous US to rule out DVT.    Pt was admitted to [facility] and underwent ischemic workup with CCTA 10/28 showing The calcium score is severe at 660 Agatston units, which is at the 84 percentile, adjusted for age, gender and race. 2. Moderate to severe OM1 stenosis due to calcific and non-calcific plaque 3. Probably nonobstructive RPDA showing Nonobstructive coronary artery disease in remaining segments. ECHO was done 10/27 LVEF Normal right ventricular size and systolic function. Aortic sclerosis without significant stenosis.  No evidence of pulmonary hypertension. pericardial effusion No prior echo is available for comparison. On 10/27 Patient underwent Duplex US of LE with no evidence of DVT.  Patient then was planned for cardiac cath on 10/31 however on morning of cath patient spiked a fever and full set of labs were done with BCX negative to date, WBC WNL, UA negative and with most likely cause phlebitis due to IV. Patient therefore underwent cath on 11/1/2022 with Sneha Vanessa/ IC Fellow Luigi, 1vCAD with OM1 80% --> s/p CHEIKH x 1 EDP 12, nl EF. Access site right radial, TR band removed without any complications, no hematoma or bleeding. PT recommended home PT.      Pt was admitted overnight to [facility] for monitoring and has been seen and examined at bedside this morning. Overnight pt's -180s s/p Lopressor 25mg  PO x 1 and Amlodipine 5mg PO x 1 with improvement. Pt is out of bed and ambulating with no complaints. R radial access stable with no hematoma, no bleed, distal pulse to baseline. Lab values (WBC bumped up from 10.95k from 9.50k, afebrile, most likely post cath will repeat lab work as outpt), telemetry, and vital signs reviewed and remained stable. Discharge medication regimen reviewed with patient and Dr. Lawson. Pt will continue aspirin 81mg and Plavix 75 daily, Amlodipine 10mg daily, Losartan 100mg daily, Metoprolol Succinate 25mg daily, Atorvastatin 40mg qhs.  All discharge instructions reviewed with patient and medications e-prescribed to pharmacy. Pt is cleared for discharge per Dr. Lawson, and will follow up with   within 1-2 weeks of discharge.     VS Stable  Gen: NAD, A&O x3  Cards: RRR, clear S1 and S2 without murmur  Pulm: CTA B/L without w/r/r  Right Radial: No hematoma or ooze, peripheral pulses 2+ B/L  Abd: soft, NT  Ext: no LE edema or ulcerations B/L

## 2022-10-27 NOTE — PHYSICAL THERAPY INITIAL EVALUATION ADULT - LEVEL OF INDEPENDENCE: GAIT, REHAB EVAL
Pt ambulated~ 3 feet with SC with min A, unsteady gait with WBOS, PT recommends pt ambulated with rolling walker. Pt then ambulated~ 75 feet with rolling walker with CG--> CS

## 2022-10-27 NOTE — H&P ADULT - ASSESSMENT
81 y/o Sinhala speaking female, who ambulates with walker, POOR HISTORIAN with PMHX of HTN, HLD, DM2,and  Peripheral Neuropathy presents to [facility] ER this evening, 10/26/22, accompanied by son and daugher-in-law complaining of intermittent, non exertional, non radiating, midsternal chest pressure  associated with weakness, lightheadedness, SOB and palpitations that began at 5PM this evening.   Pt. also endorses R>L LE edema with right leg "heaviness" with mild calf pain for one month.

## 2022-10-27 NOTE — PROGRESS NOTE ADULT - SUBJECTIVE AND OBJECTIVE BOX
Interventional Cardiology PA Adult Progress Note    C.C.:     Subjective Assessment:      ROS Negative except as per Subjective and HPI  	  MEDICATIONS:  amLODIPine   Tablet 5 milliGRAM(s) Oral daily  hydrochlorothiazide 12.5 milliGRAM(s) Oral daily  losartan 100 milliGRAM(s) Oral daily  metoprolol succinate ER 25 milliGRAM(s) Oral daily            atorvastatin 20 milliGRAM(s) Oral at bedtime  dextrose 50% Injectable 25 Gram(s) IV Push once  dextrose 50% Injectable 12.5 Gram(s) IV Push once  dextrose 50% Injectable 25 Gram(s) IV Push once  dextrose Oral Gel 15 Gram(s) Oral once PRN  glucagon  Injectable 1 milliGRAM(s) IntraMuscular once  insulin lispro (ADMELOG) corrective regimen sliding scale   SubCutaneous Before meals and at bedtime    aspirin enteric coated 81 milliGRAM(s) Oral daily  dextrose 5%. 1000 milliLiter(s) IV Continuous <Continuous>  dextrose 5%. 1000 milliLiter(s) IV Continuous <Continuous>  magnesium oxide 400 milliGRAM(s) Oral once  potassium chloride    Tablet ER 20 milliEquivalent(s) Oral once      	    [PHYSICAL EXAM:  TELEMETRY:  T(C): 37.5 (10-27-22 @ 09:01), Max: 37.5 (10-27-22 @ 09:01)  HR: 83 (10-27-22 @ 09:01) (81 - 110)  BP: 150/73 (10-27-22 @ 09:01) (144/77 - 178/76)  RR: 18 (10-27-22 @ 09:01) (18 - 18)  SpO2: 98% (10-27-22 @ 09:01) (96% - 98%)  Wt(kg): --  I&O's Summary      Salazar:  Central/PICC/Mid Line:                                         Appearance: Normal	  HEENT:   Normal oral mucosa, PERRL, EOMI	  Neck: Supple, + JVD/ - JVD; Carotid Bruit   Cardiovascular: Normal S1 S2, No JVD, No murmurs,   Respiratory: Lungs clear to auscultation/Decreased Breath Sounds/No Rales, Rhonchi, Wheezing	  Gastrointestinal:  Soft, Non-tender, + BS	  Skin: No rashes, No ecchymoses, No cyanosis  Extremities: Normal range of motion, No clubbing, cyanosis or edema  Vascular: Peripheral pulses palpable 2+ bilaterally  Neurologic: Non-focal  Psychiatry: A & O x 3, Mood & affect appropriate      	    ECG:  	  RADIOLOGY:   DIAGNOSTIC TESTING:  [ ] Echocardiogram:  [ ]  Catheterization:  [ ] Stress Test:    [ ] ROSY  OTHER: 	    LABS:	 	  CARDIAC MARKERS:                                  12.5   9.13  )-----------( 308      ( 27 Oct 2022 06:04 )             36.3     10-27    130<L>  |  92<L>  |  4<L>  ----------------------------<  195<H>  3.8   |  26  |  0.41<L>    Ca    9.7      27 Oct 2022 06:04  Mg     1.5     10-27    TPro  7.4  /  Alb  3.6  /  TBili  0.5  /  DBili  0.2  /  AST  23  /  ALT  29  /  AlkPhos  69  10-27    proBNP: Serum Pro-Brain Natriuretic Peptide: 120 pg/mL (10-26 @ 21:17)    Lipid Profile:   HgA1c:   TSH: Thyroid Stimulating Hormone, Serum: 0.741 uIU/mL (10-27 @ 06:04)    PT/INR - ( 27 Oct 2022 06:04 )   PT: 12.4 sec;   INR: 1.04          PTT - ( 27 Oct 2022 06:04 )  PTT:30.6 sec    ASSESSMENT/PLAN: 	        DVT ppx:  Dispo:     Interventional Cardiology PA Adult Progress Note    Subjective Assessment: Patient seen and examined at bedside. Denies CP, SOB, palpitations. Would like to eat. Explained to patient that she must remain NPO until further testing can be completed.       ROS Negative except as per Subjective and HPI  	  MEDICATIONS:  amLODIPine   Tablet 5 milliGRAM(s) Oral daily  hydrochlorothiazide 12.5 milliGRAM(s) Oral daily  losartan 100 milliGRAM(s) Oral daily  metoprolol succinate ER 25 milliGRAM(s) Oral daily  atorvastatin 20 milliGRAM(s) Oral at bedtime  dextrose 50% Injectable 25 Gram(s) IV Push once  dextrose 50% Injectable 12.5 Gram(s) IV Push once  dextrose 50% Injectable 25 Gram(s) IV Push once  dextrose Oral Gel 15 Gram(s) Oral once PRN  glucagon  Injectable 1 milliGRAM(s) IntraMuscular once  insulin lispro (ADMELOG) corrective regimen sliding scale   SubCutaneous Before meals and at bedtime  aspirin enteric coated 81 milliGRAM(s) Oral daily  dextrose 5%. 1000 milliLiter(s) IV Continuous <Continuous>  dextrose 5%. 1000 milliLiter(s) IV Continuous <Continuous>  magnesium oxide 400 milliGRAM(s) Oral once  potassium chloride    Tablet ER 20 milliEquivalent(s) Oral once      	    [PHYSICAL EXAM:  TELEMETRY:  T(C): 37.5 (10-27-22 @ 09:01), Max: 37.5 (10-27-22 @ 09:01)  HR: 83 (10-27-22 @ 09:01) (81 - 110)  BP: 150/73 (10-27-22 @ 09:01) (144/77 - 178/76)  RR: 18 (10-27-22 @ 09:01) (18 - 18)  SpO2: 98% (10-27-22 @ 09:01) (96% - 98%)  Wt(kg): --  I&O's Summary                                       Appearance: Normal	  HEENT:   Normal oral mucosa, PERRL, EOMI	  Neck: Supple,  - JVD; -Carotid Bruit   Cardiovascular: Normal S1 S2, No murmurs,   Respiratory: Lungs clear to auscultation/Decreased Breath Sounds/No Rales, Rhonchi, Wheezing	  Gastrointestinal:  Soft, Non-tender, + BS	  Skin: No rashes, No ecchymoses, No cyanosis  Extremities: Normal range of motion, No clubbing, cyanosis or edema  Vascular: Peripheral pulses palpable 2+ bilaterally  Neurologic: Non-focal  Psychiatry: A & O x 3, Mood & affect appropriate    LABS:	 	  CARDIAC MARKERS:                        12.5   9.13  )-----------( 308      ( 27 Oct 2022 06:04 )             36.3     10-27    130<L>  |  92<L>  |  4<L>  ----------------------------<  195<H>  3.8   |  26  |  0.41<L>    Ca    9.7      27 Oct 2022 06:04  Mg     1.5     10-27    TPro  7.4  /  Alb  3.6  /  TBili  0.5  /  DBili  0.2  /  AST  23  /  ALT  29  /  AlkPhos  69  10-27    proBNP: Serum Pro-Brain Natriuretic Peptide: 120 pg/mL (10-26 @ 21:17)    Lipid Profile:   HgA1c:   TSH: Thyroid Stimulating Hormone, Serum: 0.741 uIU/mL (10-27 @ 06:04)    PT/INR - ( 27 Oct 2022 06:04 )   PT: 12.4 sec;   INR: 1.04          PTT - ( 27 Oct 2022 06:04 )  PTT:30.6 sec

## 2022-10-27 NOTE — H&P ADULT - NSHPSOURCEINFOTX_GEN_ALL_CORE
Emergency Contact: Aquilino Napoles [phone]; Son Dereck Napoles: [phone]; Daughter in law Rosalba  [phone]

## 2022-10-27 NOTE — DISCHARGE NOTE PROVIDER - NSDCMRMEDTOKEN_GEN_ALL_CORE_FT
amLODIPine 5 mg oral tablet: 1 tab(s) orally once a day  atorvastatin 20 mg oral tablet: 1 tab(s) orally once a day  glimepiride 2 mg oral tablet: 1 tab(s) orally once a day  hydroCHLOROthiazide 12.5 mg oral tablet: 1 tab(s) orally once a day  losartan 100 mg oral tablet: 1 tab(s) orally once a day  metFORMIN 850 mg oral tablet: 1 tab(s) orally 2 times a day  metoprolol succinate 25 mg oral tablet, extended release: 1 tab(s) orally once a day   amLODIPine 10 mg oral tablet: 1 tab(s) orally once a day  aspirin 81 mg oral delayed release tablet: 1 tab(s) orally once a day  atorvastatin 40 mg oral tablet: 1 tab(s) orally once a day (at bedtime)  clopidogrel 75 mg oral tablet: 1 tab(s) orally once a day  Cozaar 100 mg oral tablet: 1 tab(s) orally once a day  glimepiride 2 mg oral tablet: 1 tab(s) orally once a day  hydroCHLOROthiazide 12.5 mg oral tablet: 1 tab(s) orally once a day  metFORMIN 850 mg oral tablet: 1 tab(s) orally 2 times a day  metoprolol succinate 25 mg oral tablet, extended release: 1 tab(s) orally once a day

## 2022-10-27 NOTE — PATIENT PROFILE ADULT - SAFE PLACE TO LIVE
Aguila Payne discharged via ambulatory with family.  Discharge instructions given and reviewed, patient educated to follow up with PCP, verbalized understanding.  Prescriptions given x 0.  All personal belongings in possession.  No questions at this time.           no

## 2022-10-27 NOTE — PHYSICAL THERAPY INITIAL EVALUATION ADULT - FOLLOWS COMMANDS/ANSWERS QUESTIONS, REHAB EVAL
Pt is Setswana speaking, Pt prefer son assist with translation t/o session./100% of the time/able to follow single-step instructions

## 2022-10-27 NOTE — PHYSICAL THERAPY INITIAL EVALUATION ADULT - GENERAL OBSERVATIONS, REHAB EVAL
Pt received semi supine in bed with +hep-lock, +EKG, NAD. Pt left as found, NAD, call bell in reach, family present, RN Lois bautista.

## 2022-10-27 NOTE — DISCHARGE NOTE PROVIDER - NSDCHHNEEDSERVICE_GEN_ALL_CORE
Medication teaching and assessment/Observation and assessment/Rehabilitation services Medication teaching and assessment/Observation and assessment/Teaching and training

## 2022-10-27 NOTE — DISCHARGE NOTE PROVIDER - CARE PROVIDER_API CALL
Aaron Berry)  Cardiovascular Disease; Nuclear Cardiology  [address], [address] [place] [address]  Phone: [phone]  Fax: [phone]  Follow Up Time: 1 week

## 2022-10-27 NOTE — PROGRESS NOTE ADULT - PROBLEM SELECTOR PLAN 7
Lovenox 40mg SQ daily; ambulate as tolerated    Dispo: Possible discharge today if patient not agreeable to CCTA -Complaining of b/l LE numbness and tingling  (Diabetic Neuropathy?)

## 2022-10-27 NOTE — PHYSICAL THERAPY INITIAL EVALUATION ADULT - GAIT DEVIATIONS NOTED, PT EVAL
increased lateral sway, no lose of balance during ambulating with rolling waker, decreased heel strike and hip flexion bilaterally./decreased jake/increased time in double stance/decreased step length

## 2022-10-27 NOTE — H&P ADULT - NSHPREVIEWOFSYSTEMS_GEN_ALL_CORE
GENERAL, CONSTITUTIONAL : denies recent weight loss, fever, chills  EYES, VISION: denies changes in vision   EARS, NOSE, THROAT: denies hearing loss  HEART, CARDIOVASCULAR: admits to midsternal  chest pressure, , palpitations,   RESPIRATORY: Admits to SOB; ARTIS  Denies wheezing, PND, orthopnea  GASTROINTESTINAL: Denies abdominal pain, heartburn, bloody stool, dark tarry stool  GENITOURINARY: Denies frequent urination, urgency  MUSCULOSKELETAL denies joint pain or swelling, restricted motion, musculoskeletal pain.   SKIN & INTEGUMENTARY Denies rashes, sores, blisters, blisters, growths.  NEUROLOGICAL: Denies numbness or tingling sensations, sensation loss, burning.   PSYCHIATRIC: Denies nervousness, anxiety, depression  ENDOCRINE Denies heat or cold intolerance, excessive thirst  HEMATOLOGIC/LYMPHATIC: Denies abnormal bleeding, bleeding of any kind GENERAL, CONSTITUTIONAL : denies recent weight loss, fever, chills  EYES, VISION: denies changes in vision   EARS, NOSE, THROAT: denies hearing loss  HEART, CARDIOVASCULAR: admits to midsternal  chest pressure, , palpitations,   RESPIRATORY: Admits to SOB; ARTIS  Denies wheezing, PND, orthopnea  GASTROINTESTINAL: Denies abdominal pain, heartburn, bloody stool, dark tarry stool  GENITOURINARY: Denies frequent urination, urgency  MUSCULOSKELETAL admits to  joint pain or swelling, , musculoskeletal pain.   SKIN & INTEGUMENTARY Denies rashes, sores, blisters, blisters, growths.  NEUROLOGICAL: admits to LE numbness or tingling sensations, sensation loss, burning.   PSYCHIATRIC: Denies nervousness, anxiety, depression  ENDOCRINE Denies heat or cold intolerance, excessive thirst  HEMATOLOGIC/LYMPHATIC: Denies abnormal bleeding, bleeding of any kind

## 2022-10-27 NOTE — PHYSICAL THERAPY INITIAL EVALUATION ADULT - PERTINENT HX OF CURRENT PROBLEM, REHAB EVAL
Pt is an 81 yo female presents to [facility] ER 10/26/22, accompanied by son and daugher-in-law complaining of intermittent, non exertional, non radiating, midsternal chest pressure associated with weakness, lightheadedness, SOB and palpitations. Pt is admitted to tele/stepdown to r/o ACS.

## 2022-10-27 NOTE — PHYSICAL THERAPY INITIAL EVALUATION ADULT - ADDITIONAL COMMENTS
Pt lives with family in an apt with elevator. Prior to admission, pt ambulated with SC. Pt denies recent fall. As per pt's son, there is always a family member at home to assist pt with ADLs and functional mobility if needed.

## 2022-10-27 NOTE — H&P ADULT - PROBLEM SELECTOR PLAN 1
Intermittent, non exertional, non radiating midsternal "heavy" chest pressure associated with weakness, lightheadedness, SOB and palpitations   -Cardiac Enzymes neg X2,   -TTE in AM  -NPO for stress test vs. CCTA

## 2022-10-27 NOTE — H&P ADULT - NSHPSOCIALHISTORY_GEN_ALL_CORE
Mongolian speaking female, who ambulates with cane, ; lives with  and son.  Denies Tobacco, EtOH and illicit

## 2022-10-27 NOTE — H&P ADULT - REASON FOR ADMISSION
Intermittent, non exertional  midsternal chest pressure associated with weakness, lightheadedness,  SOB and palpitations since 5PM this evening.    Pt. also endorses Right leg "heaviness" with +1 -2 pitting edema.   Pt. admitted to rule out ACS and b/l LE venous doppler.

## 2022-10-27 NOTE — PATIENT PROFILE ADULT - FALL HARM RISK - HARM RISK INTERVENTIONS

## 2022-10-27 NOTE — PROGRESS NOTE ADULT - PROBLEM SELECTOR PLAN 8
Lovenox 40mg SQ daily; ambulate as tolerated    Dispo: Possible discharge today if patient not agreeable to CCTA

## 2022-10-27 NOTE — DISCHARGE NOTE PROVIDER - REASON FOR ADMISSION
Intermittent, non exertional  midsternal chest pressure associated with weakness, lightheadedness,  SOB and palpitations since 5PM this evening.    Pt. also endorses Right leg "heaviness" with +1 -2 pitting edema.   Pt. admitted to rule out ACS and b/l LE venous doppler. Chest Pain

## 2022-10-27 NOTE — PROGRESS NOTE ADULT - PROBLEM SELECTOR PLAN 6
-Complaining of b/l LE numbness and tingling  (Diabetic Neuropathy?) Asymptomatic. Na 130  - Monitor Na

## 2022-10-27 NOTE — PROGRESS NOTE ADULT - PROBLEM SELECTOR PLAN 2
-D-dimer 318  (age appropriate)   -b/l LE Venous doppler 10/27: No evidence of deep venous thrombosis in either lower extremity.

## 2022-10-27 NOTE — PROGRESS NOTE ADULT - PROBLEM SELECTOR PLAN 1
p/w intermittent, non exertional, non radiating midsternal "heavy" chest pressure associated with weakness, lightheadedness, SOB and palpitations. Now CP free   -Cardiac Enzymes neg X2,   -TTE 10/27: Normal left ventricular size and systolic function. Normal right ventricular size and systolic function. Aortic sclerosis without significant stenosis. EF 63%  -NPO for stress test, however patient refused. Will discuss possibility of going for CCTA

## 2022-10-27 NOTE — H&P ADULT - PROBLEM SELECTOR PLAN 3
-Monitor BP  -Continue BP medication: Amlodipine 5mg PO daily, Metoprolol XL 25mg PO daily, HCTZ 12.5mg PO daily, Losartan 100mg PO daily.

## 2022-10-27 NOTE — H&P ADULT - NSHPLABSRESULTS_GEN_ALL_CORE
12.8   8.49  )-----------( 330      ( 26 Oct 2022 21:17 )             37.3       10-    127<L>  |  86<L>  |  4<L>  ----------------------------<  221<H>  4.2   |  25  |  0.44<L>    Ca    9.9      26 Oct 2022 21:17    TPro  8.0  /  Alb  4.4  /  TBili  0.6  /  DBili  x   /  AST  26  /  ALT  35  /  AlkPhos  79  10-          CARDIAC MARKERS ( 26 Oct 2022 21:17 )  x     / <0.01 ng/mL / 49 U/L / x     / 1.9 ng/mL        Urinalysis Basic - ( 26 Oct 2022 23:29 )    Color: Yellow / Appearance: Clear / S.010 / pH: x  Gluc: x / Ketone: NEGATIVE  / Bili: Negative / Urobili: 0.2 E.U./dL   Blood: x / Protein: NEGATIVE mg/dL / Nitrite: NEGATIVE   Leuk Esterase: NEGATIVE / RBC: x / WBC x   Sq Epi: x / Non Sq Epi: x / Bacteria: x        EKG: Sinus Tachy HR @105bpm with PAC's RBBB

## 2022-10-28 LAB
ANION GAP SERPL CALC-SCNC: 12 MMOL/L — SIGNIFICANT CHANGE UP (ref 5–17)
BUN SERPL-MCNC: 7 MG/DL — SIGNIFICANT CHANGE UP (ref 7–23)
CALCIUM SERPL-MCNC: 9.6 MG/DL — SIGNIFICANT CHANGE UP (ref 8.4–10.5)
CHLORIDE SERPL-SCNC: 96 MMOL/L — SIGNIFICANT CHANGE UP (ref 96–108)
CO2 SERPL-SCNC: 28 MMOL/L — SIGNIFICANT CHANGE UP (ref 22–31)
CREAT SERPL-MCNC: 0.45 MG/DL — LOW (ref 0.5–1.3)
EGFR: 97 ML/MIN/1.73M2 — SIGNIFICANT CHANGE UP
GLUCOSE BLDC GLUCOMTR-MCNC: 149 MG/DL — HIGH (ref 70–99)
GLUCOSE BLDC GLUCOMTR-MCNC: 154 MG/DL — HIGH (ref 70–99)
GLUCOSE BLDC GLUCOMTR-MCNC: 196 MG/DL — HIGH (ref 70–99)
GLUCOSE SERPL-MCNC: 151 MG/DL — HIGH (ref 70–99)
HCT VFR BLD CALC: 37.5 % — SIGNIFICANT CHANGE UP (ref 34.5–45)
HGB BLD-MCNC: 12.8 G/DL — SIGNIFICANT CHANGE UP (ref 11.5–15.5)
MAGNESIUM SERPL-MCNC: 1.7 MG/DL — SIGNIFICANT CHANGE UP (ref 1.6–2.6)
MCHC RBC-ENTMCNC: 28.3 PG — SIGNIFICANT CHANGE UP (ref 27–34)
MCHC RBC-ENTMCNC: 34.1 GM/DL — SIGNIFICANT CHANGE UP (ref 32–36)
MCV RBC AUTO: 83 FL — SIGNIFICANT CHANGE UP (ref 80–100)
NRBC # BLD: 0 /100 WBCS — SIGNIFICANT CHANGE UP (ref 0–0)
PLATELET # BLD AUTO: 339 K/UL — SIGNIFICANT CHANGE UP (ref 150–400)
POTASSIUM SERPL-MCNC: 4 MMOL/L — SIGNIFICANT CHANGE UP (ref 3.5–5.3)
POTASSIUM SERPL-SCNC: 4 MMOL/L — SIGNIFICANT CHANGE UP (ref 3.5–5.3)
RBC # BLD: 4.52 M/UL — SIGNIFICANT CHANGE UP (ref 3.8–5.2)
RBC # FLD: 14 % — SIGNIFICANT CHANGE UP (ref 10.3–14.5)
SODIUM SERPL-SCNC: 136 MMOL/L — SIGNIFICANT CHANGE UP (ref 135–145)
WBC # BLD: 8.51 K/UL — SIGNIFICANT CHANGE UP (ref 3.8–10.5)
WBC # FLD AUTO: 8.51 K/UL — SIGNIFICANT CHANGE UP (ref 3.8–10.5)

## 2022-10-28 PROCEDURE — 75574 CT ANGIO HRT W/3D IMAGE: CPT | Mod: 26

## 2022-10-28 PROCEDURE — 99233 SBSQ HOSP IP/OBS HIGH 50: CPT

## 2022-10-28 RX ORDER — METOPROLOL TARTRATE 50 MG
25 TABLET ORAL ONCE
Refills: 0 | Status: COMPLETED | OUTPATIENT
Start: 2022-10-28 | End: 2022-10-28

## 2022-10-28 RX ORDER — MAGNESIUM OXIDE 400 MG ORAL TABLET 241.3 MG
800 TABLET ORAL ONCE
Refills: 0 | Status: COMPLETED | OUTPATIENT
Start: 2022-10-28 | End: 2022-10-28

## 2022-10-28 RX ADMIN — Medication 81 MILLIGRAM(S): at 14:07

## 2022-10-28 RX ADMIN — Medication 25 MILLIGRAM(S): at 10:17

## 2022-10-28 RX ADMIN — Medication 25 MILLIGRAM(S): at 05:56

## 2022-10-28 RX ADMIN — MAGNESIUM OXIDE 400 MG ORAL TABLET 800 MILLIGRAM(S): 241.3 TABLET ORAL at 09:42

## 2022-10-28 RX ADMIN — Medication 2: at 22:33

## 2022-10-28 RX ADMIN — AMLODIPINE BESYLATE 5 MILLIGRAM(S): 2.5 TABLET ORAL at 05:57

## 2022-10-28 RX ADMIN — Medication 12.5 MILLIGRAM(S): at 05:56

## 2022-10-28 RX ADMIN — ATORVASTATIN CALCIUM 20 MILLIGRAM(S): 80 TABLET, FILM COATED ORAL at 22:33

## 2022-10-28 RX ADMIN — LOSARTAN POTASSIUM 100 MILLIGRAM(S): 100 TABLET, FILM COATED ORAL at 05:57

## 2022-10-28 RX ADMIN — Medication 2: at 07:19

## 2022-10-28 RX ADMIN — Medication 25 MILLIGRAM(S): at 09:43

## 2022-10-28 NOTE — PROGRESS NOTE ADULT - SUBJECTIVE AND OBJECTIVE BOX
Interventional Cardiology PA Adult Progress Note    Subjective Assessment: Seen and exmained bedside. No acute complaints today. Denies chest pain, SOB, ARTIS, palpitations, dizziness, LOC, N/V/D, fever/chills/sick contact, diaphoresis, orthopnea/PND, and leg swelling.    ROS negative except as noted above.  	  MEDICATIONS:  amLODIPine   Tablet 5 milliGRAM(s) Oral daily  hydrochlorothiazide 12.5 milliGRAM(s) Oral daily  losartan 100 milliGRAM(s) Oral daily  metoprolol succinate ER 25 milliGRAM(s) Oral daily  atorvastatin 20 milliGRAM(s) Oral at bedtime  dextrose 50% Injectable 25 Gram(s) IV Push once  dextrose 50% Injectable 12.5 Gram(s) IV Push once  dextrose 50% Injectable 25 Gram(s) IV Push once  dextrose Oral Gel 15 Gram(s) Oral once PRN  glucagon  Injectable 1 milliGRAM(s) IntraMuscular once  insulin lispro (ADMELOG) corrective regimen sliding scale   SubCutaneous Before meals and at bedtime  aspirin enteric coated 81 milliGRAM(s) Oral daily  dextrose 5%. 1000 milliLiter(s) IV Continuous <Continuous>  dextrose 5%. 1000 milliLiter(s) IV Continuous <Continuous>  influenza  Vaccine (HIGH DOSE) 0.7 milliLiter(s) IntraMuscular once      	    [PHYSICAL EXAM:  TELEMETRY: EDWIGE  T(C): 36.8 (10-28-22 @ 13:54), Max: 36.9 (10-27-22 @ 22:23)  HR: 77 (10-28-22 @ 16:48) (75 - 88)  BP: 167/75 (10-28-22 @ 16:48) (135/65 - 178/74)  RR: 16 (10-28-22 @ 16:48) (16 - 18)  SpO2: 94% (10-28-22 @ 16:48) (94% - 98%)  Wt(kg): --  I&O's Summary    27 Oct 2022 07:01  -  28 Oct 2022 07:00  --------------------------------------------------------  IN: 0 mL / OUT: 0 mL / NET: 0 mL    28 Oct 2022 07:01  -  28 Oct 2022 18:04  --------------------------------------------------------  IN: 0 mL / OUT: 0 mL / NET: 0 mL                                              Appearance: Normal	  HEENT:   Normal oral mucosa, PERRLA, EOMI	  Neck: Supple, - JVD; Carotid Bruit   Cardiovascular: Normal S1 S2, No JVD, No murmurs,   Respiratory: Lungs clear to auscultation, No Rales, Rhonchi, Wheezing	  Gastrointestinal:  Soft, Non-tender, + BS	  Skin: No rashes, No ecchymoses, No cyanosis  Extremities: Normal range of motion, No clubbing, cyanosis or edema  Vascular: Peripheral pulses palpable 2+ bilaterally  Neurologic: Non-focal  Psychiatry: A & O x 3, Mood & affect appropriate      	    ECG:  	  RADIOLOGY:   DIAGNOSTIC TESTING:  [ ] Echocardiogram:  [ ]  Catheterization:  [ ] Stress Test:    [ ] ROSY  OTHER: 	    LABS:	 	                12.8   8.51  )-----------( 339      ( 28 Oct 2022 07:56 )             37.5     10-28    136  |  96  |  7   ----------------------------<  151<H>  4.0   |  28  |  0.45<L>    Ca    9.6      28 Oct 2022 07:56  Mg     1.7     10-28    TPro  7.4  /  Alb  3.6  /  TBili  0.5  /  DBili  0.2  /  AST  23  /  ALT  29  /  AlkPhos  69  10-27    proBNP:   Lipid Profile:   HgA1c:   TSH:   PT/INR - ( 27 Oct 2022 06:04 )   PT: 12.4 sec;   INR: 1.04          PTT - ( 27 Oct 2022 06:04 )  PTT:30.6 sec

## 2022-10-28 NOTE — PROGRESS NOTE ADULT - NSPROGADDITIONALINFOA_GEN_ALL_CORE
Attending Attestation:  I was physically present for the key portions of the evaluation and management (E/M) service provided.  I agree with the above history, physical, and plan which I have reviewed with the following edits/addendum:    82F with hx DM2, HLD, HTN p/w chest pain, palpitations. ECG RBBB. D dimer was negative. Serial troponin negative. Admit for ACS r/o. TTE 10/27: normal findings. 10/28 CCTA reviewed: OM1 prox lesion, mod-severe.   - Cont home meds: Losartan 100, HCTZ 25, Amlodipine 5 daily for BP control, Toprol 25XL daily, Atorva 20 qHS  - pt amenable for functional stress testing/possible cath on monday     Miguel Vick MD  Cardiologist    35 minutes spent on total encounter; more than 50% of the visit was spent counseling and/or coordinating care by the attending physician.

## 2022-10-28 NOTE — PROGRESS NOTE ADULT - PROBLEM SELECTOR PLAN 4
Hgb A1C 6.7  -Hold patient's oral hyperglycemic medications Metformin 850mg PO bid and Glimepride 2mg PO daily  -Continue Insulin Lispro Med dose sliding scale    F: Oral intake  E: Replete electrolytes as needed for K<4 and Mg<2  N: DASH Diet    DVT PPX: SQH   Dispo: pending NST results  PT recommending home PT with family assist    Case discussed with Dr. Vick

## 2022-10-28 NOTE — PROGRESS NOTE ADULT - ASSESSMENT
81 y/o Amharic speaking female, who ambulates with walker, POOR HISTORIAN with PMHX of HTN, HLD, DM2,and  Peripheral Neuropathy presents to [facility] ER 10/26/22, accompanied by son and daugher-in-law complaining of intermittent, non exertional, non radiating, midsternal chest pressure associated with weakness, lightheadedness, SOB and palpitations. CCTA showing Ca score 660 with mod-severe OM1, Plan for NST 10/31 with possible cath
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
## 2022-10-29 LAB
ANION GAP SERPL CALC-SCNC: 13 MMOL/L — SIGNIFICANT CHANGE UP (ref 5–17)
BUN SERPL-MCNC: 13 MG/DL — SIGNIFICANT CHANGE UP (ref 7–23)
CALCIUM SERPL-MCNC: 9.7 MG/DL — SIGNIFICANT CHANGE UP (ref 8.4–10.5)
CHLORIDE SERPL-SCNC: 92 MMOL/L — LOW (ref 96–108)
CO2 SERPL-SCNC: 27 MMOL/L — SIGNIFICANT CHANGE UP (ref 22–31)
CREAT SERPL-MCNC: 0.54 MG/DL — SIGNIFICANT CHANGE UP (ref 0.5–1.3)
EGFR: 93 ML/MIN/1.73M2 — SIGNIFICANT CHANGE UP
GLUCOSE BLDC GLUCOMTR-MCNC: 149 MG/DL — HIGH (ref 70–99)
GLUCOSE BLDC GLUCOMTR-MCNC: 168 MG/DL — HIGH (ref 70–99)
GLUCOSE BLDC GLUCOMTR-MCNC: 219 MG/DL — HIGH (ref 70–99)
GLUCOSE BLDC GLUCOMTR-MCNC: 236 MG/DL — HIGH (ref 70–99)
GLUCOSE SERPL-MCNC: 165 MG/DL — HIGH (ref 70–99)
HCT VFR BLD CALC: 39.7 % — SIGNIFICANT CHANGE UP (ref 34.5–45)
HGB BLD-MCNC: 13.3 G/DL — SIGNIFICANT CHANGE UP (ref 11.5–15.5)
MAGNESIUM SERPL-MCNC: 1.9 MG/DL — SIGNIFICANT CHANGE UP (ref 1.6–2.6)
MCHC RBC-ENTMCNC: 28.2 PG — SIGNIFICANT CHANGE UP (ref 27–34)
MCHC RBC-ENTMCNC: 33.5 GM/DL — SIGNIFICANT CHANGE UP (ref 32–36)
MCV RBC AUTO: 84.1 FL — SIGNIFICANT CHANGE UP (ref 80–100)
NRBC # BLD: 0 /100 WBCS — SIGNIFICANT CHANGE UP (ref 0–0)
PLATELET # BLD AUTO: 348 K/UL — SIGNIFICANT CHANGE UP (ref 150–400)
POTASSIUM SERPL-MCNC: 3.7 MMOL/L — SIGNIFICANT CHANGE UP (ref 3.5–5.3)
POTASSIUM SERPL-SCNC: 3.7 MMOL/L — SIGNIFICANT CHANGE UP (ref 3.5–5.3)
RBC # BLD: 4.72 M/UL — SIGNIFICANT CHANGE UP (ref 3.8–5.2)
RBC # FLD: 14.1 % — SIGNIFICANT CHANGE UP (ref 10.3–14.5)
SODIUM SERPL-SCNC: 132 MMOL/L — LOW (ref 135–145)
WBC # BLD: 9.05 K/UL — SIGNIFICANT CHANGE UP (ref 3.8–10.5)
WBC # FLD AUTO: 9.05 K/UL — SIGNIFICANT CHANGE UP (ref 3.8–10.5)

## 2022-10-29 PROCEDURE — 99233 SBSQ HOSP IP/OBS HIGH 50: CPT

## 2022-10-29 RX ORDER — ENOXAPARIN SODIUM 100 MG/ML
40 INJECTION SUBCUTANEOUS EVERY 24 HOURS
Refills: 0 | Status: DISCONTINUED | OUTPATIENT
Start: 2022-10-29 | End: 2022-11-02

## 2022-10-29 RX ORDER — POTASSIUM CHLORIDE 20 MEQ
40 PACKET (EA) ORAL ONCE
Refills: 0 | Status: COMPLETED | OUTPATIENT
Start: 2022-10-29 | End: 2022-10-29

## 2022-10-29 RX ORDER — MAGNESIUM OXIDE 400 MG ORAL TABLET 241.3 MG
400 TABLET ORAL ONCE
Refills: 0 | Status: COMPLETED | OUTPATIENT
Start: 2022-10-29 | End: 2022-10-29

## 2022-10-29 RX ADMIN — Medication 25 MILLIGRAM(S): at 05:13

## 2022-10-29 RX ADMIN — ENOXAPARIN SODIUM 40 MILLIGRAM(S): 100 INJECTION SUBCUTANEOUS at 10:49

## 2022-10-29 RX ADMIN — Medication 40 MILLIEQUIVALENT(S): at 10:48

## 2022-10-29 RX ADMIN — Medication 4: at 17:34

## 2022-10-29 RX ADMIN — AMLODIPINE BESYLATE 5 MILLIGRAM(S): 2.5 TABLET ORAL at 05:13

## 2022-10-29 RX ADMIN — ATORVASTATIN CALCIUM 20 MILLIGRAM(S): 80 TABLET, FILM COATED ORAL at 22:59

## 2022-10-29 RX ADMIN — Medication 2: at 06:55

## 2022-10-29 RX ADMIN — Medication 12.5 MILLIGRAM(S): at 05:13

## 2022-10-29 RX ADMIN — Medication 4: at 12:38

## 2022-10-29 RX ADMIN — MAGNESIUM OXIDE 400 MG ORAL TABLET 400 MILLIGRAM(S): 241.3 TABLET ORAL at 10:49

## 2022-10-29 RX ADMIN — Medication 81 MILLIGRAM(S): at 10:48

## 2022-10-29 RX ADMIN — LOSARTAN POTASSIUM 100 MILLIGRAM(S): 100 TABLET, FILM COATED ORAL at 05:13

## 2022-10-29 NOTE — PROGRESS NOTE ADULT - PROBLEM SELECTOR PLAN 1
p/w intermittent, non exertional, non radiating midsternal "heavy" chest pressure associated with weakness, lightheadedness, SOB and palpitations. Now CP free   -Cardiac Enzymes neg X2,   -TTE 10/27: Normal left ventricular size and systolic function. Normal right ventricular size and systolic function. Aortic sclerosis without significant stenosis. EF 63%  - CCTA 10/28/22: Ca 660, mod-severe OM1, probably nonobstructive RPDA, nonobstructive remaining segments  -- plan for stress test 10/31/22, likely pharmacologic vs exercise as patient uses walker  -- NPO @[place] Sunday night  -- possibility of cath after stress testing, patient is aware of this and is amenable to staying the weekend p/w intermittent, non exertional, non radiating midsternal "heavy" chest pressure associated with weakness, lightheadedness, SOB and palpitations. Now CP free   -- Cardiac Enzymes neg X2,   -- TTE 10/27: Normal left ventricular size and systolic function. Normal right ventricular size and systolic function. Aortic sclerosis without significant stenosis. EF 63%  -- CCTA 10/28/22: Ca 660, mod-severe OM1, probably nonobstructive RPDA, nonobstructive remaining segments  -- plan for stress test 10/31/22, likely pharmacologic vs exercise as patient uses walker  -- NPO @[place] night

## 2022-10-29 NOTE — PROGRESS NOTE ADULT - PROBLEM SELECTOR PLAN 4
Hgb A1C 6.7  -Hold patient's oral hyperglycemic medications Metformin 850mg PO bid and Glimepride 2mg PO daily  -Continue Insulin Lispro Med dose sliding scale    F: Oral intake  E: Replete electrolytes as needed for K<4 and Mg<2  N: DASH Diet    DVT PPX: SQH   Dispo: pending NST results  PT recommending home PT with family assist    Case discussed with Dr. Vick Hgb A1C 6.7  -- home meds: Metformin 850mg PO bid and Glimepride 2mg PO daily  -- Continue Insulin Lispro Med dose sliding scale    F: Oral intake  E: Replete electrolytes as needed for K<4 and Mg<2  N: DASH Diet    DVT PPX: SQH   Dispo: pending NST results  PT recommending home PT with family assist    Case discussed with Dr. Cleveland

## 2022-10-29 NOTE — PROGRESS NOTE ADULT - PROBLEM SELECTOR PLAN 2
-Monitor BP  -Continue BP medication: Amlodipine 5mg PO daily, Metoprolol XL 25mg PO daily, HCTZ 12.5mg PO daily, Losartan 100mg PO daily. -140s  -Continue BP medication: Amlodipine 5mg PO daily, Metoprolol XL 25mg PO daily, HCTZ 12.5mg PO daily, Losartan 100mg PO daily. -140s  -Continue BP medication: Amlodipine 5mg PO daily, Metoprolol XL 25mg PO daily, Losartan 100mg PO daily.  -- d/c'd HCTZ due to hyponatremia  -- continue to monitor Na, K, Mg levels

## 2022-10-29 NOTE — PROGRESS NOTE ADULT - SUBJECTIVE AND OBJECTIVE BOX
Interventional Cardiology PA Adult Progress Note    Subjective Assessment: Seen and examined bedside. No acute complaints. Denies chest pain, SOB, ARTIS, palpitations, dizziness, LOC, N/V/D, fever/chills/sick contact, diaphoresis, orthopnea/PND, and leg swelling.    ROS negative except as noted above.  	  MEDICATIONS:  amLODIPine   Tablet 5 milliGRAM(s) Oral daily  hydrochlorothiazide 12.5 milliGRAM(s) Oral daily  losartan 100 milliGRAM(s) Oral daily  metoprolol succinate ER 25 milliGRAM(s) Oral daily  atorvastatin 20 milliGRAM(s) Oral at bedtime  dextrose 50% Injectable 25 Gram(s) IV Push once  dextrose 50% Injectable 12.5 Gram(s) IV Push once  dextrose 50% Injectable 25 Gram(s) IV Push once  dextrose Oral Gel 15 Gram(s) Oral once PRN  glucagon  Injectable 1 milliGRAM(s) IntraMuscular once  insulin lispro (ADMELOG) corrective regimen sliding scale   SubCutaneous Before meals and at bedtime  aspirin enteric coated 81 milliGRAM(s) Oral daily  dextrose 5%. 1000 milliLiter(s) IV Continuous <Continuous>  dextrose 5%. 1000 milliLiter(s) IV Continuous <Continuous>  influenza  Vaccine (HIGH DOSE) 0.7 milliLiter(s) IntraMuscular once      	    [PHYSICAL EXAM:  TELEMETRY: EDWIGE  T(C): 35.9 (10-29-22 @ 06:19), Max: 36.8 (10-28-22 @ 13:54)  HR: 84 (10-29-22 @ 05:10) (77 - 87)  BP: 148/70 (10-29-22 @ 05:10) (135/65 - 167/75)  RR: 18 (10-29-22 @ 05:10) (16 - 18)  SpO2: 95% (10-29-22 @ 05:10) (94% - 98%)  Wt(kg): --  I&O's Summary    28 Oct 2022 07:01  -  29 Oct 2022 07:00  --------------------------------------------------------  IN: 100 mL / OUT: 500 mL / NET: -400 mL                                              Appearance: Normal	  HEENT:   Normal oral mucosa, PERRLA, EOMI	  Neck: Supple,   - JVD; Carotid Bruit   Cardiovascular: Normal S1 S2, No JVD, No murmurs,   Respiratory: Lungs clear to auscultation, No Rales, Rhonchi, Wheezing	  Gastrointestinal:  Soft, Non-tender, + BS	  Skin: No rashes, No ecchymoses, No cyanosis  Extremities: Normal range of motion, No clubbing, cyanosis or edema  Vascular: Peripheral pulses palpable 2+ bilaterally  Neurologic: Non-focal  Psychiatry: A & O x 3, Mood & affect appropriate      	    ECG:  	  RADIOLOGY:   DIAGNOSTIC TESTING:  [ ] Echocardiogram:  [ ]  Catheterization:  [ ] Stress Test:    [ ] ROSY  OTHER: 	    LABS:	 	  CARDIAC MARKERS:                                  12.8   8.51  )-----------( 339      ( 28 Oct 2022 07:56 )             37.5     10-28    136  |  96  |  7   ----------------------------<  151<H>  4.0   |  28  |  0.45<L>    Ca    9.6      28 Oct 2022 07:56  Mg     1.7     10-28

## 2022-10-29 NOTE — PROGRESS NOTE ADULT - ASSESSMENT
81 y/o Tajik speaking female, who ambulates with walker, POOR HISTORIAN with PMHX of HTN, HLD, DM2,and  Peripheral Neuropathy presents to [facility] ER 10/26/22, accompanied by son and daugher-in-law complaining of intermittent, non exertional, non radiating, midsternal chest pressure associated with weakness, lightheadedness, SOB and palpitations. CCTA showing Ca score 660 with mod-severe OM1, Plan for NST 10/31 with possible cath

## 2022-10-29 NOTE — PROGRESS NOTE ADULT - PROBLEM SELECTOR PLAN 3
Cholesterol 111 Triglycerides 48 HDL 48 LDL 50  -Atorvastatin 20mg PO daily Cholesterol 111 Triglycerides 48 HDL 48 LDL 50  -- c/w Atorvastatin 20mg PO daily

## 2022-10-30 LAB
ANION GAP SERPL CALC-SCNC: 10 MMOL/L — SIGNIFICANT CHANGE UP (ref 5–17)
BUN SERPL-MCNC: 11 MG/DL — SIGNIFICANT CHANGE UP (ref 7–23)
CALCIUM SERPL-MCNC: 9.9 MG/DL — SIGNIFICANT CHANGE UP (ref 8.4–10.5)
CHLORIDE SERPL-SCNC: 95 MMOL/L — LOW (ref 96–108)
CO2 SERPL-SCNC: 27 MMOL/L — SIGNIFICANT CHANGE UP (ref 22–31)
CREAT SERPL-MCNC: 0.47 MG/DL — LOW (ref 0.5–1.3)
EGFR: 96 ML/MIN/1.73M2 — SIGNIFICANT CHANGE UP
GLUCOSE BLDC GLUCOMTR-MCNC: 148 MG/DL — HIGH (ref 70–99)
GLUCOSE BLDC GLUCOMTR-MCNC: 186 MG/DL — HIGH (ref 70–99)
GLUCOSE BLDC GLUCOMTR-MCNC: 202 MG/DL — HIGH (ref 70–99)
GLUCOSE BLDC GLUCOMTR-MCNC: 227 MG/DL — HIGH (ref 70–99)
GLUCOSE BLDC GLUCOMTR-MCNC: 258 MG/DL — HIGH (ref 70–99)
GLUCOSE SERPL-MCNC: 183 MG/DL — HIGH (ref 70–99)
HCT VFR BLD CALC: 36.5 % — SIGNIFICANT CHANGE UP (ref 34.5–45)
HGB BLD-MCNC: 12.4 G/DL — SIGNIFICANT CHANGE UP (ref 11.5–15.5)
MAGNESIUM SERPL-MCNC: 3.6 MG/DL — HIGH (ref 1.6–2.6)
MCHC RBC-ENTMCNC: 28.2 PG — SIGNIFICANT CHANGE UP (ref 27–34)
MCHC RBC-ENTMCNC: 34 GM/DL — SIGNIFICANT CHANGE UP (ref 32–36)
MCV RBC AUTO: 83 FL — SIGNIFICANT CHANGE UP (ref 80–100)
NRBC # BLD: 0 /100 WBCS — SIGNIFICANT CHANGE UP (ref 0–0)
PLATELET # BLD AUTO: 312 K/UL — SIGNIFICANT CHANGE UP (ref 150–400)
POTASSIUM SERPL-MCNC: 4 MMOL/L — SIGNIFICANT CHANGE UP (ref 3.5–5.3)
POTASSIUM SERPL-SCNC: 4 MMOL/L — SIGNIFICANT CHANGE UP (ref 3.5–5.3)
RBC # BLD: 4.4 M/UL — SIGNIFICANT CHANGE UP (ref 3.8–5.2)
RBC # FLD: 14.1 % — SIGNIFICANT CHANGE UP (ref 10.3–14.5)
SODIUM SERPL-SCNC: 132 MMOL/L — LOW (ref 135–145)
WBC # BLD: 7.65 K/UL — SIGNIFICANT CHANGE UP (ref 3.8–10.5)
WBC # FLD AUTO: 7.65 K/UL — SIGNIFICANT CHANGE UP (ref 3.8–10.5)

## 2022-10-30 PROCEDURE — 99233 SBSQ HOSP IP/OBS HIGH 50: CPT

## 2022-10-30 RX ORDER — CLOPIDOGREL BISULFATE 75 MG/1
600 TABLET, FILM COATED ORAL ONCE
Refills: 0 | Status: COMPLETED | OUTPATIENT
Start: 2022-10-31 | End: 2022-10-31

## 2022-10-30 RX ORDER — SODIUM CHLORIDE 9 MG/ML
1000 INJECTION INTRAMUSCULAR; INTRAVENOUS; SUBCUTANEOUS
Refills: 0 | Status: DISCONTINUED | OUTPATIENT
Start: 2022-10-30 | End: 2022-11-01

## 2022-10-30 RX ADMIN — Medication 25 MILLIGRAM(S): at 06:20

## 2022-10-30 RX ADMIN — SODIUM CHLORIDE 75 MILLILITER(S): 9 INJECTION INTRAMUSCULAR; INTRAVENOUS; SUBCUTANEOUS at 19:12

## 2022-10-30 RX ADMIN — Medication 4: at 17:03

## 2022-10-30 RX ADMIN — LOSARTAN POTASSIUM 100 MILLIGRAM(S): 100 TABLET, FILM COATED ORAL at 06:20

## 2022-10-30 RX ADMIN — Medication 6: at 13:06

## 2022-10-30 RX ADMIN — ENOXAPARIN SODIUM 40 MILLIGRAM(S): 100 INJECTION SUBCUTANEOUS at 10:00

## 2022-10-30 RX ADMIN — AMLODIPINE BESYLATE 5 MILLIGRAM(S): 2.5 TABLET ORAL at 06:20

## 2022-10-30 RX ADMIN — Medication 81 MILLIGRAM(S): at 12:58

## 2022-10-30 RX ADMIN — ATORVASTATIN CALCIUM 20 MILLIGRAM(S): 80 TABLET, FILM COATED ORAL at 21:56

## 2022-10-30 RX ADMIN — Medication 2: at 06:46

## 2022-10-30 NOTE — PROGRESS NOTE ADULT - PROBLEM SELECTOR PLAN 2
-140s  -Continue BP medication: Amlodipine 5mg PO daily, Metoprolol XL 25mg PO daily, Losartan 100mg PO daily.  -- d/c'd HCTZ due to hyponatremia  -- continue to monitor Na, K, Mg levels

## 2022-10-30 NOTE — PROGRESS NOTE ADULT - PROBLEM SELECTOR PLAN 1
p/w intermittent, non exertional, non radiating midsternal "heavy" chest pressure associated with weakness, lightheadedness, SOB and palpitations. Now CP free   -- Cardiac Enzymes neg X2,    -- TTE 10/27: Normal left ventricular size and systolic function. Normal right ventricular size and systolic function. Aortic sclerosis without significant stenosis. EF 63%  -- CCTA 10/28/22: Ca 660, mod-severe OM1, probably nonobstructive RPDA, nonobstructive remaining segments  -- plan for stress test 10/31/22, versus Cath (patient is consented for cath and email sent ) likely pharmacologic vs exercise as patient uses walker  -- NPO @[place] night for cath vs NST

## 2022-10-30 NOTE — PROGRESS NOTE ADULT - ASSESSMENT
81 y/o Japanese speaking female, who ambulates with walker, POOR HISTORIAN with PMHX of HTN, HLD, DM2,and  Peripheral Neuropathy presents to [facility] ER 10/26/22, accompanied by son and daugher-in-law complaining of intermittent, non exertional, non radiating, midsternal chest pressure associated with weakness, lightheadedness, SOB and palpitations. CCTA showing Ca score 660 with mod-severe OM1, Plan for NST 10/31 with possible cath patient consented and email sent

## 2022-10-30 NOTE — PROGRESS NOTE ADULT - PROBLEM SELECTOR PLAN 4
Hgb A1C 6.7  -- home meds: Metformin 850mg PO bid and Glimepride 2mg PO daily  -- Continue Insulin Lispro Med dose sliding scale    F: Oral intake  E: Replete electrolytes as needed for K<4 and Mg<2  N: DASH Diet    DVT PPX: SQH   Dispo: pending NST results  PT recommending home PT with family assist    Case discussed with Dr. Cleveland

## 2022-10-30 NOTE — PROGRESS NOTE ADULT - SUBJECTIVE AND OBJECTIVE BOX
INCOMPLETE OPEN NOTE FOR ATTENDING   INCOMPLETE    OVERNIGHT EVENTS:  No acute events overnight.  SUBJECTIVE / INTERVAL HPI: Patient seen and examined at bedside. Denies CP, SOB, dizziness/lightheadedness, palpitations. Pt reports no symptoms at this time.     12 point ROS otherwise negative    VITAL SIGNS:  Vital Signs Last 24 Hrs  T(C): 36.4 (30 Oct 2022 09:05), Max: 36.9 (29 Oct 2022 22:15)  T(F): 97.6 (30 Oct 2022 09:05), Max: 98.4 (29 Oct 2022 22:15)  HR: 81 (30 Oct 2022 09:59) (81 - 93)  BP: 136/63 (30 Oct 2022 09:59) (136/63 - 170/77)  BP(mean): 119 (30 Oct 2022 06:11) (92 - 119)  RR: 18 (30 Oct 2022 09:59) (16 - 18)  SpO2: 97% (30 Oct 2022 09:59) (92% - 100%)    Parameters below as of 30 Oct 2022 09:59  Patient On (Oxygen Delivery Method): room air          I&O's Summary    29 Oct 2022 07:01  -  30 Oct 2022 07:00  --------------------------------------------------------  IN: 460 mL / OUT: 0 mL / NET: 460 mL          PHYSICAL EXAM:    General: sitting up in bed, NAD  HEENT: conjunctiva clear; MMM  Neck: supple, no JVD  Cardiovascular: RRR, no murmurs  Respiratory: CTA B/L  Gastrointestinal: soft, NT/ND, +BS  Extremities: WWP, no edema or cyanosis  Vascular: Peripheral pulses palpable 2+ bilaterally/ carotid 2+ b/l, no bruits; radial 2+ b/l; femoral 2+ b/l no bruits; DP/PT 2+ b/l  Neurological: AAOx3, no focal deficits    MEDICATIONS:  MEDICATIONS  (STANDING):  amLODIPine   Tablet 5 milliGRAM(s) Oral daily  aspirin enteric coated 81 milliGRAM(s) Oral daily  atorvastatin 20 milliGRAM(s) Oral at bedtime  dextrose 5%. 1000 milliLiter(s) (50 mL/Hr) IV Continuous <Continuous>  dextrose 5%. 1000 milliLiter(s) (100 mL/Hr) IV Continuous <Continuous>  dextrose 50% Injectable 25 Gram(s) IV Push once  dextrose 50% Injectable 12.5 Gram(s) IV Push once  dextrose 50% Injectable 25 Gram(s) IV Push once  enoxaparin Injectable 40 milliGRAM(s) SubCutaneous every 24 hours  glucagon  Injectable 1 milliGRAM(s) IntraMuscular once  influenza  Vaccine (HIGH DOSE) 0.7 milliLiter(s) IntraMuscular once  insulin lispro (ADMELOG) corrective regimen sliding scale   SubCutaneous Before meals and at bedtime  losartan 100 milliGRAM(s) Oral daily  metoprolol succinate ER 25 milliGRAM(s) Oral daily    MEDICATIONS  (PRN):  dextrose Oral Gel 15 Gram(s) Oral once PRN Blood Glucose LESS THAN 70 milliGRAM(s)/deciliter      LABS:                        12.4   7.65  )-----------( 312      ( 30 Oct 2022 07:22 )             36.5       10-30    132<L>  |  95<L>  |  11  ----------------------------<  183<H>  4.0   |  27  |  0.47<L>    Ca    9.9      30 Oct 2022 07:22  Mg     1.7     10-30                  TELEMETRY:    RADIOLOGY & ADDITIONAL TESTS: Reviewed.

## 2022-10-31 LAB
ALBUMIN SERPL ELPH-MCNC: 3.9 G/DL — SIGNIFICANT CHANGE UP (ref 3.3–5)
ALP SERPL-CCNC: 77 U/L — SIGNIFICANT CHANGE UP (ref 40–120)
ALT FLD-CCNC: 26 U/L — SIGNIFICANT CHANGE UP (ref 10–45)
ANION GAP SERPL CALC-SCNC: 11 MMOL/L — SIGNIFICANT CHANGE UP (ref 5–17)
ANION GAP SERPL CALC-SCNC: 11 MMOL/L — SIGNIFICANT CHANGE UP (ref 5–17)
APTT BLD: 29.4 SEC — SIGNIFICANT CHANGE UP (ref 27.5–35.5)
AST SERPL-CCNC: 21 U/L — SIGNIFICANT CHANGE UP (ref 10–40)
BILIRUB SERPL-MCNC: 0.5 MG/DL — SIGNIFICANT CHANGE UP (ref 0.2–1.2)
BUN SERPL-MCNC: 14 MG/DL — SIGNIFICANT CHANGE UP (ref 7–23)
BUN SERPL-MCNC: 8 MG/DL — SIGNIFICANT CHANGE UP (ref 7–23)
CALCIUM SERPL-MCNC: 9.5 MG/DL — SIGNIFICANT CHANGE UP (ref 8.4–10.5)
CALCIUM SERPL-MCNC: 9.8 MG/DL — SIGNIFICANT CHANGE UP (ref 8.4–10.5)
CHLORIDE SERPL-SCNC: 95 MMOL/L — LOW (ref 96–108)
CHLORIDE SERPL-SCNC: 96 MMOL/L — SIGNIFICANT CHANGE UP (ref 96–108)
CO2 SERPL-SCNC: 27 MMOL/L — SIGNIFICANT CHANGE UP (ref 22–31)
CO2 SERPL-SCNC: 28 MMOL/L — SIGNIFICANT CHANGE UP (ref 22–31)
CREAT SERPL-MCNC: 0.48 MG/DL — LOW (ref 0.5–1.3)
CREAT SERPL-MCNC: 0.57 MG/DL — SIGNIFICANT CHANGE UP (ref 0.5–1.3)
CRP SERPL-MCNC: 18.5 MG/L — HIGH (ref 0–4)
EGFR: 92 ML/MIN/1.73M2 — SIGNIFICANT CHANGE UP
EGFR: 96 ML/MIN/1.73M2 — SIGNIFICANT CHANGE UP
ERYTHROCYTE [SEDIMENTATION RATE] IN BLOOD: 66 MM/HR — HIGH
GLUCOSE BLDC GLUCOMTR-MCNC: 142 MG/DL — HIGH (ref 70–99)
GLUCOSE BLDC GLUCOMTR-MCNC: 203 MG/DL — HIGH (ref 70–99)
GLUCOSE BLDC GLUCOMTR-MCNC: 211 MG/DL — HIGH (ref 70–99)
GLUCOSE BLDC GLUCOMTR-MCNC: 222 MG/DL — HIGH (ref 70–99)
GLUCOSE SERPL-MCNC: 194 MG/DL — HIGH (ref 70–99)
GLUCOSE SERPL-MCNC: 196 MG/DL — HIGH (ref 70–99)
HCT VFR BLD CALC: 35.9 % — SIGNIFICANT CHANGE UP (ref 34.5–45)
HCT VFR BLD CALC: 37 % — SIGNIFICANT CHANGE UP (ref 34.5–45)
HGB BLD-MCNC: 12.3 G/DL — SIGNIFICANT CHANGE UP (ref 11.5–15.5)
HGB BLD-MCNC: 12.5 G/DL — SIGNIFICANT CHANGE UP (ref 11.5–15.5)
INR BLD: 1.02 — SIGNIFICANT CHANGE UP (ref 0.88–1.16)
LACTATE SERPL-SCNC: 0.9 MMOL/L — SIGNIFICANT CHANGE UP (ref 0.5–2)
MAGNESIUM SERPL-MCNC: 1.6 MG/DL — SIGNIFICANT CHANGE UP (ref 1.6–2.6)
MCHC RBC-ENTMCNC: 28.4 PG — SIGNIFICANT CHANGE UP (ref 27–34)
MCHC RBC-ENTMCNC: 28.8 PG — SIGNIFICANT CHANGE UP (ref 27–34)
MCHC RBC-ENTMCNC: 33.8 GM/DL — SIGNIFICANT CHANGE UP (ref 32–36)
MCHC RBC-ENTMCNC: 34.3 GM/DL — SIGNIFICANT CHANGE UP (ref 32–36)
MCV RBC AUTO: 82.9 FL — SIGNIFICANT CHANGE UP (ref 80–100)
MCV RBC AUTO: 85.3 FL — SIGNIFICANT CHANGE UP (ref 80–100)
NRBC # BLD: 0 /100 WBCS — SIGNIFICANT CHANGE UP (ref 0–0)
NRBC # BLD: 0 /100 WBCS — SIGNIFICANT CHANGE UP (ref 0–0)
PLATELET # BLD AUTO: 291 K/UL — SIGNIFICANT CHANGE UP (ref 150–400)
PLATELET # BLD AUTO: 312 K/UL — SIGNIFICANT CHANGE UP (ref 150–400)
POTASSIUM SERPL-MCNC: 4 MMOL/L — SIGNIFICANT CHANGE UP (ref 3.5–5.3)
POTASSIUM SERPL-MCNC: 4 MMOL/L — SIGNIFICANT CHANGE UP (ref 3.5–5.3)
POTASSIUM SERPL-SCNC: 4 MMOL/L — SIGNIFICANT CHANGE UP (ref 3.5–5.3)
POTASSIUM SERPL-SCNC: 4 MMOL/L — SIGNIFICANT CHANGE UP (ref 3.5–5.3)
PROT SERPL-MCNC: 7.9 G/DL — SIGNIFICANT CHANGE UP (ref 6–8.3)
PROTHROM AB SERPL-ACNC: 12.1 SEC — SIGNIFICANT CHANGE UP (ref 10.5–13.4)
RBC # BLD: 4.33 M/UL — SIGNIFICANT CHANGE UP (ref 3.8–5.2)
RBC # BLD: 4.34 M/UL — SIGNIFICANT CHANGE UP (ref 3.8–5.2)
RBC # FLD: 14.1 % — SIGNIFICANT CHANGE UP (ref 10.3–14.5)
RBC # FLD: 14.2 % — SIGNIFICANT CHANGE UP (ref 10.3–14.5)
SODIUM SERPL-SCNC: 133 MMOL/L — LOW (ref 135–145)
SODIUM SERPL-SCNC: 135 MMOL/L — SIGNIFICANT CHANGE UP (ref 135–145)
WBC # BLD: 12.23 K/UL — HIGH (ref 3.8–10.5)
WBC # BLD: 8.29 K/UL — SIGNIFICANT CHANGE UP (ref 3.8–10.5)
WBC # FLD AUTO: 12.23 K/UL — HIGH (ref 3.8–10.5)
WBC # FLD AUTO: 8.29 K/UL — SIGNIFICANT CHANGE UP (ref 3.8–10.5)

## 2022-10-31 PROCEDURE — 71045 X-RAY EXAM CHEST 1 VIEW: CPT | Mod: 26

## 2022-10-31 PROCEDURE — 99233 SBSQ HOSP IP/OBS HIGH 50: CPT

## 2022-10-31 RX ORDER — ACETAMINOPHEN 500 MG
650 TABLET ORAL ONCE
Refills: 0 | Status: COMPLETED | OUTPATIENT
Start: 2022-10-31 | End: 2022-11-01

## 2022-10-31 RX ORDER — ACETAMINOPHEN 500 MG
650 TABLET ORAL ONCE
Refills: 0 | Status: COMPLETED | OUTPATIENT
Start: 2022-10-31 | End: 2022-10-31

## 2022-10-31 RX ORDER — MAGNESIUM SULFATE 500 MG/ML
2 VIAL (ML) INJECTION ONCE
Refills: 0 | Status: COMPLETED | OUTPATIENT
Start: 2022-10-31 | End: 2022-10-31

## 2022-10-31 RX ADMIN — Medication 4: at 08:05

## 2022-10-31 RX ADMIN — Medication 25 MILLIGRAM(S): at 05:56

## 2022-10-31 RX ADMIN — Medication 25 GRAM(S): at 10:41

## 2022-10-31 RX ADMIN — AMLODIPINE BESYLATE 5 MILLIGRAM(S): 2.5 TABLET ORAL at 05:56

## 2022-10-31 RX ADMIN — ATORVASTATIN CALCIUM 20 MILLIGRAM(S): 80 TABLET, FILM COATED ORAL at 21:23

## 2022-10-31 RX ADMIN — Medication 4: at 21:23

## 2022-10-31 RX ADMIN — Medication 81 MILLIGRAM(S): at 05:55

## 2022-10-31 RX ADMIN — Medication 650 MILLIGRAM(S): at 20:04

## 2022-10-31 RX ADMIN — CLOPIDOGREL BISULFATE 600 MILLIGRAM(S): 75 TABLET, FILM COATED ORAL at 05:56

## 2022-10-31 RX ADMIN — Medication 650 MILLIGRAM(S): at 22:56

## 2022-10-31 RX ADMIN — Medication 30 MILLILITER(S): at 15:02

## 2022-10-31 RX ADMIN — Medication 650 MILLIGRAM(S): at 23:30

## 2022-10-31 RX ADMIN — LOSARTAN POTASSIUM 100 MILLIGRAM(S): 100 TABLET, FILM COATED ORAL at 05:56

## 2022-10-31 RX ADMIN — Medication 650 MILLIGRAM(S): at 20:37

## 2022-10-31 NOTE — PROGRESS NOTE ADULT - PROBLEM SELECTOR PLAN 1
p/w intermittent, non exertional, non radiating midsternal "heavy" chest pressure associated with weakness, lightheadedness, SOB and palpitations. Now CP free   -- Cardiac Enzymes neg X2,    -- TTE 10/27: Normal left ventricular size and systolic function. Normal right ventricular size and systolic function. Aortic sclerosis without significant stenosis. EF 63%  -- CCTA 10/28/22: Ca 660, mod-severe OM1, probably nonobstructive RPDA, nonobstructive remaining segments  -- plan for stress test 10/31/22, versus Cath (patient is consented for cath and email sent ) likely pharmacologic vs exercise as patient uses walker  -- NPO @[place] night for cath vs NST p/w intermittent, non exertional, non radiating midsternal "heavy" chest pressure associated with weakness, lightheadedness, SOB and palpitations. Now CP free   -- CE neg x 3,    -- TTE 10/27: Normal left ventricular size and systolic function. Normal right ventricular size and systolic function. Aortic sclerosis without significant stenosis. EF 63%  -- CCTA 10/28/22: Ca 660, mod-severe OM1, probably nonobstructive RPDA, nonobstructive remaining segments  -- plan for stress test 10/31/22, versus Cath (patient is consented for cath and email sent ) likely pharmacologic vs exercise as patient uses walker  -- Plan for cardiac cath 10/31 w/ Dr. Flowers

## 2022-10-31 NOTE — PROGRESS NOTE ADULT - PROBLEM SELECTOR PLAN 4
Hgb A1C 6.7  -- home meds: Metformin 850mg PO bid and Glimepride 2mg PO daily  -- Continue Insulin Lispro Med dose sliding scale    F: Oral intake  E: Replete electrolytes as needed for K<4 and Mg<2  N: DASH Diet    DVT PPX: SQH   Dispo: pending NST results  PT recommending home PT with family assist    Case discussed with Dr. Cleveland Hgb A1C 6.7  -- home meds: Metformin 850mg PO bid and Glimepiride 2mg PO daily  -- Continue Insulin Lispro Med dose sliding scale    F: Oral intake  E: Replete electrolytes as needed for K<4 and Mg<2  N: DASH Diet    DVT PPX: SQH cath   PT recommending home PT with family assist    Case discussed with Dr. Cleveland Hgb A1C 6.7  -- home meds: Metformin 850mg PO bid and Glimepiride 2mg PO daily  -- Continue Insulin Lispro Med dose sliding scale    F: Oral intake  E: Replete electrolytes as needed for K<4 and Mg<2  N: DASH Diet    DVT PPX: SQH cath   PT recommending home PT with family assist    Case discussed with Dr. Lawson

## 2022-10-31 NOTE — PROGRESS NOTE ADULT - PROBLEM SELECTOR PLAN 2
-140s  -Continue BP medication: Amlodipine 5mg PO daily, Metoprolol XL 25mg PO daily, Losartan 100mg PO daily.  -- d/c'd HCTZ due to hyponatremia  -- continue to monitor Na, K, Mg levels -140s  -Continue BP medication: Amlodipine 5mg PO daily, Metoprolol XL 25mg PO daily, Losartan 100mg PO daily.  -- d/c'd HCTZ due to hyponatremia, now improved

## 2022-10-31 NOTE — CHART NOTE - NSCHARTNOTEFT_GEN_A_CORE
Cath 10/31 cancelled- Pa informed that on arrival to cath lab pt felt warm, pt febrile by rectal temp, tachycardic to 110's. Per interventional pt given bolus of fluids in the cath lab, recommend infectious w/u and NPO after MN again for cath tomorrow. Pt's IV site noted to be erythematous- possible early cellulitis, IV being changed in cath lab. Pt c/o 2 episodes of loose stool earlier today (pt attributed to the food) and cough for a few days that started prior to arrival. Ordered CBC/CMP, lactate, ESR, CRP, Blood cx x 2, UA, RVP, stool cx, CXR. Will monitor closely

## 2022-10-31 NOTE — PROGRESS NOTE ADULT - ASSESSMENT
83 y/o Romansh speaking female, who ambulates with walker, POOR HISTORIAN with PMHX of HTN, HLD, DM2,and  Peripheral Neuropathy presents to [facility] ER 10/26/22, accompanied by son and daugher-in-law complaining of intermittent, non exertional, non radiating, midsternal chest pressure associated with weakness, lightheadedness, SOB and palpitations. CCTA showing Ca score 660 with mod-severe OM1, Plan for NST 10/31 with possible cath patient consented and email sent  83 y/o Romansh speaking female, POOR HISTORIAN with PMHx of HTN, HLD, DM2 and Peripheral Neuropathy who presented to [facility] ER 10/26/22 complaining of intermittent, non exertional, non radiating, midsternal chest pressure associated with weakness, lightheadedness, SOB and palpitations. Pt was admitted to cardiology for r/o ACS and ischemic eval. Trop neg x 3. Pt underwent CCTA 10/29 revealing elevated Ca score and mod-severe OM1. Pt now pending cardiac cath 10/31.

## 2022-10-31 NOTE — PROGRESS NOTE ADULT - SUBJECTIVE AND OBJECTIVE BOX
INCOMPLETE  S: Pt seen and examined bedside.  Patient denies C/P, SOB, N/V, dizziness, palpitations, and diaphoresis.  Pt denies fever/chills, dysuria, abdominal pain, diarrhea, and cough  12 Point ROS otherwise negative except as per HPI/subjective.     O: Vital Signs Last 24 Hrs  T(C): 36.8 (31 Oct 2022 06:58), Max: 36.8 (31 Oct 2022 06:58)  T(F): 98.2 (31 Oct 2022 06:58), Max: 98.2 (31 Oct 2022 06:58)  HR: 84 (31 Oct 2022 09:03) (80 - 90)  BP: 141/65 (31 Oct 2022 09:03) (131/61 - 161/72)  BP(mean): --  RR: 18 (31 Oct 2022 09:03) (18 - 18)  SpO2: 95% (31 Oct 2022 09:03) (93% - 97%)    Parameters below as of 31 Oct 2022 09:03  Patient On (Oxygen Delivery Method): room air        PHYSICAL EXAM:  GEN: NAD  HEENT: No JVD  PULM:  CTA B/L  CARD:  RRR, S1 and S2   ABD: +BS, NT, soft/ND	  EXT: No Edema B/L LE  NEURO: A+Ox3, no focal deficit  PSYCH: Mood Appropriate    LABS:                        12.3   8.29  )-----------( 291      ( 31 Oct 2022 07:03 )             35.9     10-31    135  |  96  |  8   ----------------------------<  194<H>  4.0   |  28  |  0.48<L>    Ca    9.5      31 Oct 2022 07:03  Mg     1.6     10-31      PT/INR - ( 31 Oct 2022 07:03 )   PT: 12.1 sec;   INR: 1.02          PTT - ( 31 Oct 2022 07:03 )  PTT:29.4 sec  Troponin T, Serum: 0.01 ng/mL (10-27-22 @ 06:04)  Troponin T, Serum: <0.01 ng/mL (10-27-22 @ 00:18)  Troponin T, Serum: <0.01 ng/mL (10-26-22 @ 21:17)      10-30 @ 07:01  -  10-31 @ 07:00  --------------------------------------------------------  IN: 1080 mL / OUT: 250 mL / NET: 830 mL      Daily     Daily    S: Pt seen and examined bedside. CAL Shane spoke to the pt in Uz[place]. Pt reports she is feeling well this morning, she wants to get the procedure over with so she can go home.   Patient denies C/P, SOB, N/V, dizziness, palpitations, and diaphoresis.  Pt denies fever/chills, dysuria, abdominal pain, diarrhea, and cough  12 Point ROS otherwise negative except as per HPI/subjective.     O: Vital Signs Last 24 Hrs  T(C): 36.8 (31 Oct 2022 06:58), Max: 36.8 (31 Oct 2022 06:58)  T(F): 98.2 (31 Oct 2022 06:58), Max: 98.2 (31 Oct 2022 06:58)  HR: 84 (31 Oct 2022 09:03) (80 - 90)  BP: 141/65 (31 Oct 2022 09:03) (131/61 - 161/72)  BP(mean): --  RR: 18 (31 Oct 2022 09:03) (18 - 18)  SpO2: 95% (31 Oct 2022 09:03) (93% - 97%)    Parameters below as of 31 Oct 2022 09:03  Patient On (Oxygen Delivery Method): room air        PHYSICAL EXAM:  GEN: NAD  HEENT: No JVD  PULM:  CTA B/L, no WRR  CARD:  RRR, S1 and S2, no murmurs   ABD: +BS, NT, soft/ND	  EXT: No Edema B/L LE, distal pulses 2+ B/L   NEURO: A+Ox3, no focal deficit  PSYCH: Mood Appropriate    LABS:                        12.3   8.29  )-----------( 291      ( 31 Oct 2022 07:03 )             35.9     10-31    135  |  96  |  8   ----------------------------<  194<H>  4.0   |  28  |  0.48<L>    Ca    9.5      31 Oct 2022 07:03  Mg     1.6     10-31      PT/INR - ( 31 Oct 2022 07:03 )   PT: 12.1 sec;   INR: 1.02          PTT - ( 31 Oct 2022 07:03 )  PTT:29.4 sec  Troponin T, Serum: 0.01 ng/mL (10-27-22 @ 06:04)  Troponin T, Serum: <0.01 ng/mL (10-27-22 @ 00:18)  Troponin T, Serum: <0.01 ng/mL (10-26-22 @ 21:17)      10-30 @ 07:01  -  10-31 @ 07:00  --------------------------------------------------------  IN: 1080 mL / OUT: 250 mL / NET: 830 mL      Daily     Daily

## 2022-11-01 DIAGNOSIS — I80.9 PHLEBITIS AND THROMBOPHLEBITIS OF UNSPECIFIED SITE: ICD-10-CM

## 2022-11-01 LAB
ANION GAP SERPL CALC-SCNC: 9 MMOL/L — SIGNIFICANT CHANGE UP (ref 5–17)
BASOPHILS # BLD AUTO: 0.04 K/UL — SIGNIFICANT CHANGE UP (ref 0–0.2)
BASOPHILS NFR BLD AUTO: 0.4 % — SIGNIFICANT CHANGE UP (ref 0–2)
BUN SERPL-MCNC: 7 MG/DL — SIGNIFICANT CHANGE UP (ref 7–23)
CALCIUM SERPL-MCNC: 9.6 MG/DL — SIGNIFICANT CHANGE UP (ref 8.4–10.5)
CHLORIDE SERPL-SCNC: 95 MMOL/L — LOW (ref 96–108)
CO2 SERPL-SCNC: 29 MMOL/L — SIGNIFICANT CHANGE UP (ref 22–31)
CREAT SERPL-MCNC: 0.44 MG/DL — LOW (ref 0.5–1.3)
EGFR: 98 ML/MIN/1.73M2 — SIGNIFICANT CHANGE UP
EOSINOPHIL # BLD AUTO: 0.13 K/UL — SIGNIFICANT CHANGE UP (ref 0–0.5)
EOSINOPHIL NFR BLD AUTO: 1.4 % — SIGNIFICANT CHANGE UP (ref 0–6)
GLUCOSE BLDC GLUCOMTR-MCNC: 137 MG/DL — HIGH (ref 70–99)
GLUCOSE BLDC GLUCOMTR-MCNC: 143 MG/DL — HIGH (ref 70–99)
GLUCOSE BLDC GLUCOMTR-MCNC: 170 MG/DL — HIGH (ref 70–99)
GLUCOSE BLDC GLUCOMTR-MCNC: 249 MG/DL — HIGH (ref 70–99)
GLUCOSE BLDC GLUCOMTR-MCNC: 274 MG/DL — HIGH (ref 70–99)
GLUCOSE SERPL-MCNC: 161 MG/DL — HIGH (ref 70–99)
HCT VFR BLD CALC: 36 % — SIGNIFICANT CHANGE UP (ref 34.5–45)
HGB BLD-MCNC: 12.2 G/DL — SIGNIFICANT CHANGE UP (ref 11.5–15.5)
IMM GRANULOCYTES NFR BLD AUTO: 0.9 % — SIGNIFICANT CHANGE UP (ref 0–0.9)
LYMPHOCYTES # BLD AUTO: 1.81 K/UL — SIGNIFICANT CHANGE UP (ref 1–3.3)
LYMPHOCYTES # BLD AUTO: 19.1 % — SIGNIFICANT CHANGE UP (ref 13–44)
MAGNESIUM SERPL-MCNC: 2 MG/DL — SIGNIFICANT CHANGE UP (ref 1.6–2.6)
MCHC RBC-ENTMCNC: 28.4 PG — SIGNIFICANT CHANGE UP (ref 27–34)
MCHC RBC-ENTMCNC: 33.9 GM/DL — SIGNIFICANT CHANGE UP (ref 32–36)
MCV RBC AUTO: 83.9 FL — SIGNIFICANT CHANGE UP (ref 80–100)
MONOCYTES # BLD AUTO: 1.17 K/UL — HIGH (ref 0–0.9)
MONOCYTES NFR BLD AUTO: 12.3 % — SIGNIFICANT CHANGE UP (ref 2–14)
NEUTROPHILS # BLD AUTO: 6.26 K/UL — SIGNIFICANT CHANGE UP (ref 1.8–7.4)
NEUTROPHILS NFR BLD AUTO: 65.9 % — SIGNIFICANT CHANGE UP (ref 43–77)
NRBC # BLD: 0 /100 WBCS — SIGNIFICANT CHANGE UP (ref 0–0)
PLATELET # BLD AUTO: 284 K/UL — SIGNIFICANT CHANGE UP (ref 150–400)
POTASSIUM SERPL-MCNC: 3.9 MMOL/L — SIGNIFICANT CHANGE UP (ref 3.5–5.3)
POTASSIUM SERPL-SCNC: 3.9 MMOL/L — SIGNIFICANT CHANGE UP (ref 3.5–5.3)
RBC # BLD: 4.29 M/UL — SIGNIFICANT CHANGE UP (ref 3.8–5.2)
RBC # FLD: 14.2 % — SIGNIFICANT CHANGE UP (ref 10.3–14.5)
SODIUM SERPL-SCNC: 133 MMOL/L — LOW (ref 135–145)
WBC # BLD: 9.5 K/UL — SIGNIFICANT CHANGE UP (ref 3.8–10.5)
WBC # FLD AUTO: 9.5 K/UL — SIGNIFICANT CHANGE UP (ref 3.8–10.5)

## 2022-11-01 PROCEDURE — 99152 MOD SED SAME PHYS/QHP 5/>YRS: CPT

## 2022-11-01 PROCEDURE — 99233 SBSQ HOSP IP/OBS HIGH 50: CPT

## 2022-11-01 PROCEDURE — 92928 PRQ TCAT PLMT NTRAC ST 1 LES: CPT | Mod: LC

## 2022-11-01 PROCEDURE — 93458 L HRT ARTERY/VENTRICLE ANGIO: CPT | Mod: 26,59

## 2022-11-01 RX ORDER — POTASSIUM CHLORIDE 20 MEQ
10 PACKET (EA) ORAL ONCE
Refills: 0 | Status: COMPLETED | OUTPATIENT
Start: 2022-11-01 | End: 2022-11-01

## 2022-11-01 RX ORDER — SODIUM CHLORIDE 9 MG/ML
250 INJECTION INTRAMUSCULAR; INTRAVENOUS; SUBCUTANEOUS ONCE
Refills: 0 | Status: COMPLETED | OUTPATIENT
Start: 2022-11-01 | End: 2022-11-01

## 2022-11-01 RX ORDER — HYDRALAZINE HCL 50 MG
10 TABLET ORAL ONCE
Refills: 0 | Status: COMPLETED | OUTPATIENT
Start: 2022-11-01 | End: 2022-11-01

## 2022-11-01 RX ORDER — SODIUM CHLORIDE 9 MG/ML
1000 INJECTION INTRAMUSCULAR; INTRAVENOUS; SUBCUTANEOUS
Refills: 0 | Status: DISCONTINUED | OUTPATIENT
Start: 2022-11-01 | End: 2022-11-02

## 2022-11-01 RX ORDER — CLOPIDOGREL BISULFATE 75 MG/1
75 TABLET, FILM COATED ORAL DAILY
Refills: 0 | Status: DISCONTINUED | OUTPATIENT
Start: 2022-11-01 | End: 2022-11-02

## 2022-11-01 RX ORDER — ATORVASTATIN CALCIUM 80 MG/1
40 TABLET, FILM COATED ORAL AT BEDTIME
Refills: 0 | Status: DISCONTINUED | OUTPATIENT
Start: 2022-11-01 | End: 2022-11-02

## 2022-11-01 RX ORDER — ACETAMINOPHEN 500 MG
650 TABLET ORAL ONCE
Refills: 0 | Status: COMPLETED | OUTPATIENT
Start: 2022-11-01 | End: 2022-11-01

## 2022-11-01 RX ADMIN — SODIUM CHLORIDE 200 MILLILITER(S): 9 INJECTION INTRAMUSCULAR; INTRAVENOUS; SUBCUTANEOUS at 20:03

## 2022-11-01 RX ADMIN — AMLODIPINE BESYLATE 5 MILLIGRAM(S): 2.5 TABLET ORAL at 06:31

## 2022-11-01 RX ADMIN — Medication 650 MILLIGRAM(S): at 10:38

## 2022-11-01 RX ADMIN — Medication 25 MILLIGRAM(S): at 06:32

## 2022-11-01 RX ADMIN — Medication 2: at 06:31

## 2022-11-01 RX ADMIN — Medication 10 MILLIGRAM(S): at 20:26

## 2022-11-01 RX ADMIN — Medication 81 MILLIGRAM(S): at 13:17

## 2022-11-01 RX ADMIN — ATORVASTATIN CALCIUM 40 MILLIGRAM(S): 80 TABLET, FILM COATED ORAL at 22:50

## 2022-11-01 RX ADMIN — Medication 4: at 22:51

## 2022-11-01 RX ADMIN — SODIUM CHLORIDE 500 MILLILITER(S): 9 INJECTION INTRAMUSCULAR; INTRAVENOUS; SUBCUTANEOUS at 13:18

## 2022-11-01 RX ADMIN — Medication 650 MILLIGRAM(S): at 21:00

## 2022-11-01 RX ADMIN — LOSARTAN POTASSIUM 100 MILLIGRAM(S): 100 TABLET, FILM COATED ORAL at 06:31

## 2022-11-01 RX ADMIN — Medication 650 MILLIGRAM(S): at 11:38

## 2022-11-01 RX ADMIN — Medication 10 MILLIEQUIVALENT(S): at 13:17

## 2022-11-01 RX ADMIN — Medication 650 MILLIGRAM(S): at 20:27

## 2022-11-01 RX ADMIN — CLOPIDOGREL BISULFATE 75 MILLIGRAM(S): 75 TABLET, FILM COATED ORAL at 13:17

## 2022-11-01 NOTE — PROGRESS NOTE ADULT - ASSESSMENT
81 y/o Danish speaking female, POOR HISTORIAN with PMHx of HTN, HLD, DM2 and Peripheral Neuropathy who presented to [facility] ER 10/26/22 complaining of intermittent, non exertional, non radiating, midsternal chest pressure associated with weakness, lightheadedness, SOB and palpitations. Pt was admitted to cardiology for r/o ACS and ischemic eval. Trop neg x 3. Pt underwent CCTA 10/29 revealing elevated Ca score and mod-severe OM1. Pt now pending cardiac cath 10/31.  81 y/o Nepali speaking female, POOR HISTORIAN with PMHx of HTN, HLD, DM2 and Peripheral Neuropathy who presented to [facility] ER 10/26/22 complaining of midsternal chest pressure associated with weakness, lightheadedness, SOB and palpitations. Pt was admitted to cardiology for r/o ACS and ischemic eval. Trop neg x 3. Pt underwent CCTA 10/29 revealing elevated Ca score and mod-severe OM1. Pt now s/p cardic cath 11/1 resulting in CHEIKH OM1. D/c in AM. 83 y/o Belarusian speaking female, POOR HISTORIAN with PMHx of HTN, HLD, DM2 and Peripheral Neuropathy who presented to [facility] ER 10/26/22 complaining of midsternal chest pressure associated with weakness, lightheadedness, SOB and palpitations. Pt was admitted to cardiology for r/o ACS and ischemic eval. Trop neg x 3. Pt underwent CCTA 10/29 revealing elevated Ca score and mod-severe OM1. Course c/b fever on 10/31, infectious w/u neg, likely 2/2 phlebitis. Pt now s/p cardic cath 11/1 resulting in CHEIKH OM1. D/c in AM.

## 2022-11-01 NOTE — PROGRESS NOTE ADULT - PROVIDER SPECIALTY LIST ADULT
Intervent Cardiology

## 2022-11-01 NOTE — PROGRESS NOTE ADULT - PROBLEM SELECTOR PLAN 5
Hgb A1C 6.7  -Hold patient's oral hyperglycemic medications Metformin 850mg PO bid and Glimepride 2mg PO daily  -Continue Insulin Lispro Med dose sliding scale
Hgb A1C 6.7  -- home meds: Metformin 850mg PO bid and Glimepiride 2mg PO daily  -- Continue Insulin Lispro Med dose sliding scale    F: Oral intake  E: Replete electrolytes as needed for K<4 and Mg<2  N: DASH Diet    DVT PPX: None   PT recommending home PT and family assist    Case discussed with Dr. Lawson

## 2022-11-01 NOTE — PROGRESS NOTE ADULT - PROBLEM SELECTOR PLAN 3
Cholesterol 111 Triglycerides 48 HDL 48 LDL 50  -- c/w Atorvastatin 20mg PO daily -140s  -Continue BP medication: Amlodipine 5mg PO daily, Metoprolol XL 25mg PO daily, Losartan 100mg PO daily.  -- d/c'd HCTZ due to hyponatremia, now improved

## 2022-11-01 NOTE — PROGRESS NOTE ADULT - SUBJECTIVE AND OBJECTIVE BOX
INCOMPLETE    S: Pt seen and examined bedside.  Patient denies C/P, SOB, N/V, dizziness, palpitations, and diaphoresis.  Pt denies fever/chills, dysuria, abdominal pain, diarrhea, and cough  12 Point ROS otherwise negative except as per HPI/subjective.     O: Vital Signs Last 24 Hrs  T(C): 36.9 (01 Nov 2022 14:20), Max: 37.6 (31 Oct 2022 22:40)  T(F): 98.4 (01 Nov 2022 14:20), Max: 99.6 (31 Oct 2022 22:40)  HR: 80 (01 Nov 2022 13:00) (80 - 100)  BP: 149/67 (01 Nov 2022 13:00) (149/67 - 167/69)  BP(mean): 96 (01 Nov 2022 13:00) (96 - 101)  RR: 18 (01 Nov 2022 13:00) (18 - 18)  SpO2: 96% (01 Nov 2022 13:00) (95% - 97%)    Parameters below as of 01 Nov 2022 13:00  Patient On (Oxygen Delivery Method): room air        PHYSICAL EXAM:  GEN: NAD  HEENT: No JVD  PULM:  CTA B/L  CARD:  RRR, S1 and S2   ABD: +BS, NT, soft/ND	  EXT: No Edema B/L LE  NEURO: A+Ox3, no focal deficit  PSYCH: Mood Appropriate    LABS:                        12.2   9.50  )-----------( 284      ( 01 Nov 2022 05:30 )             36.0     11-01    133<L>  |  95<L>  |  7   ----------------------------<  161<H>  3.9   |  29  |  0.44<L>    Ca    9.6      01 Nov 2022 05:30  Mg     2.0     11-01    TPro  7.9  /  Alb  3.9  /  TBili  0.5  /  DBili  x   /  AST  21  /  ALT  26  /  AlkPhos  77  10-31    PT/INR - ( 31 Oct 2022 07:03 )   PT: 12.1 sec;   INR: 1.02          PTT - ( 31 Oct 2022 07:03 )  PTT:29.4 sec  Troponin T, Serum: 0.01 ng/mL (10-27-22 @ 06:04)  Troponin T, Serum: <0.01 ng/mL (10-27-22 @ 00:18)  Troponin T, Serum: <0.01 ng/mL (10-26-22 @ 21:17)      10-31 @ 07:01  -  11-01 @ 07:00  --------------------------------------------------------  IN: 360 mL / OUT: 500 mL / NET: -140 mL    11-01 @ 07:01  -  11-01 @ 16:17  --------------------------------------------------------  IN: 0 mL / OUT: 0 mL / NET: 0 mL      Daily     Daily    S: Pt seen and examined bedside. Pt reports she is feeling well this morning, denies any complaints.   Patient denies C/P, SOB, N/V, dizziness, palpitations, and diaphoresis.  Pt denies fever/chills, dysuria, abdominal pain, diarrhea, and cough  12 Point ROS otherwise negative except as per HPI/subjective.     O: Vital Signs Last 24 Hrs  T(C): 36.9 (01 Nov 2022 14:20), Max: 37.6 (31 Oct 2022 22:40)  T(F): 98.4 (01 Nov 2022 14:20), Max: 99.6 (31 Oct 2022 22:40)  HR: 80 (01 Nov 2022 13:00) (80 - 100)  BP: 149/67 (01 Nov 2022 13:00) (149/67 - 167/69)  BP(mean): 96 (01 Nov 2022 13:00) (96 - 101)  RR: 18 (01 Nov 2022 13:00) (18 - 18)  SpO2: 96% (01 Nov 2022 13:00) (95% - 97%)    Parameters below as of 01 Nov 2022 13:00  Patient On (Oxygen Delivery Method): room air        PHYSICAL EXAM:  GEN: NAD  HEENT: No JVD  PULM:  CTA B/L, no WRR  CARD:  RRR, S1 and S2, no murmurs  ABD: +BS, NT, soft/ND	  EXT: No Edema B/L LE, RUE prior IV site erythematous and mildly swollen   NEURO: A+Ox3, no focal deficit  PSYCH: Mood Appropriate    LABS:                        12.2   9.50  )-----------( 284      ( 01 Nov 2022 05:30 )             36.0     11-01    133<L>  |  95<L>  |  7   ----------------------------<  161<H>  3.9   |  29  |  0.44<L>    Ca    9.6      01 Nov 2022 05:30  Mg     2.0     11-01    TPro  7.9  /  Alb  3.9  /  TBili  0.5  /  DBili  x   /  AST  21  /  ALT  26  /  AlkPhos  77  10-31    PT/INR - ( 31 Oct 2022 07:03 )   PT: 12.1 sec;   INR: 1.02          PTT - ( 31 Oct 2022 07:03 )  PTT:29.4 sec  Troponin T, Serum: 0.01 ng/mL (10-27-22 @ 06:04)  Troponin T, Serum: <0.01 ng/mL (10-27-22 @ 00:18)  Troponin T, Serum: <0.01 ng/mL (10-26-22 @ 21:17)      10-31 @ 07:01  -  11-01 @ 07:00  --------------------------------------------------------  IN: 360 mL / OUT: 500 mL / NET: -140 mL    11-01 @ 07:01  -  11-01 @ 16:17  --------------------------------------------------------  IN: 0 mL / OUT: 0 mL / NET: 0 mL      Daily     Daily

## 2022-11-01 NOTE — PROGRESS NOTE ADULT - PROBLEM SELECTOR PLAN 4
Hgb A1C 6.7  -- home meds: Metformin 850mg PO bid and Glimepiride 2mg PO daily  -- Continue Insulin Lispro Med dose sliding scale    F: Oral intake  E: Replete electrolytes as needed for K<4 and Mg<2  N: DASH Diet    DVT PPX: SQH cath   PT recommending home PT with family assist    Case discussed with Dr. Lawson Cholesterol 111 Triglycerides 48 HDL 48 LDL 50  -- c/w Atorvastatin 40mg PO daily

## 2022-11-01 NOTE — PROGRESS NOTE ADULT - PROBLEM SELECTOR PLAN 2
-140s  -Continue BP medication: Amlodipine 5mg PO daily, Metoprolol XL 25mg PO daily, Losartan 100mg PO daily.  -- d/c'd HCTZ due to hyponatremia, now improved Cath 10/31 cancelled- on arrival to cath lab pt felt warm, pt febrile by rectal temp, tachycardic to 110's. Per interventional pt given bolus of fluids in the cath lab, recommend infectious w/u and postpone cath.   - Pt's IV site noted to be erythematous- likely phlebitis, IV was changed   - Labs sent from cath lab: Blood cx NG @ 12H, ESR 66, CRP wnl, Lactate nl, WBC 12.23. CXR wnl.   - WBC now normalized, pt has been afebrile x 24 hours

## 2022-11-01 NOTE — PROGRESS NOTE ADULT - NS ATTEND AMEND GEN_ALL_CORE FT
See PA note written above, for details. I reviewed the PA documentation.  I have personally seen and examined this patient today. I reviewed vitals, labs, medications, cardiac studies and additional imaging.  I agree with the PA's findings and plans as written above with the following additions/amendments:  82F with hx Type II DM, HLD, HTN p/w chest pain, palpitations. ECG RBBB. D dimer was negative. Serial troponin negative. Admit for ACS r/o  Patient initially amenable for NST today 10/27, however upon being taken for examination patient declined to proceed with stress testing.   CTA Cardiac: The calcium score is severe at 660 Agatston units. Moderate to severe OM1 stenosis due to calcific and non-calcific plaque. Probably nonobstructive RPDA.  Nonobstructive coronary artery disease in remaining segments.  TTE performed 10/27 normal findings  LE Duplex 10/27 negative for DVT    Plan for:  NPOpbreakfast for [facility] today 10/31  DAPT load per IC protocol  Cont home meds: Losartan 100, Amlodipine 5 daily, Toprol 25XL daily  Home HCTZ on hold  Atorva 20 qHS  PT recommending home physical therapy  SW/CM assisting with home care referral. Anticipated Tues 11/1  Christiana Corrigan M.D.  Cardiology Attending
No JVD  clear lungs  no edema    off HCTZ due to low NA  plan for cath in am
82F with hx Type II DM, HLD, HTN p/w chest pain, palpitations. ECG RBBB. D dimer was negative. Serial troponin negative. Admit for ACS r/o  Patient initially amenable for NST today 10/27, however upon being taken for examination patient declined to proceed with stress testing.   CTA Cardiac: The calcium score is severe at 660 Agatston units. Moderate to severe OM1 stenosis due to calcific and non-calcific plaque. Probably nonobstructive RPDA.  Nonobstructive coronary artery disease in remaining segments.  TTE performed 10/27 normal findings  LE Duplex 10/27 negative for DVT  [facility] 10.31 cancelled due to temperature and PIV site which appeared phlebitic, PIV removed and new IV placed. [facility] rescheduled for 11/1  ROS: Patient denies cardiopulmonary symptoms. No f/c/sweats, no diarrhea, no urine symptoms.  Physical Exam notable for: elderly patient laying in bed in NAD, flat neck veins, RRR, no MGR detected, clear lungs, overly nourished abdomen, no fluid wave detected, no pretibial pitting edema, no ankle edema, skin WWP    Plan for:  NPO for [facility] todayy 11.1.22  DAPT per IC protocol  Cont home meds: Losartan 100, Amlodipine 5 daily, Toprol 25XL daily  Home HCTZ on hold  Atorva 20 qHS  PT recommending home physical therapy  SW/CM assisting with home care referral. Anticipated discharge Wed 11.2.22  Christiana Corrigan M.D.  Cardiology Attending.
no jvd  clear lungs  no edema    low NA  stop HCTZ for cath

## 2022-11-01 NOTE — PROGRESS NOTE ADULT - PROBLEM SELECTOR PLAN 1
p/w intermittent, non exertional, non radiating midsternal "heavy" chest pressure associated with weakness, lightheadedness, SOB and palpitations. Now CP free   -- CE neg x 3,    -- TTE 10/27: Normal left ventricular size and systolic function. Normal right ventricular size and systolic function. Aortic sclerosis without significant stenosis. EF 63%  -- CCTA 10/28/22: Ca 660, mod-severe OM1, probably nonobstructive RPDA, nonobstructive remaining segments  -- plan for stress test 10/31/22, versus Cath (patient is consented for cath and email sent ) likely pharmacologic vs exercise as patient uses walker  -- Plan for cardiac cath 10/31 w/ Dr. Flowers p/w intermittent, non exertional, non radiating midsternal "heavy" chest pressure associated with weakness, lightheadedness, SOB and palpitations. Now CP free   -- CE neg x 3,    - ECG NSR @ 93bpm with RBBB  -- TTE 10/27: Normal left ventricular size and systolic function. Normal right ventricular size and systolic function. Aortic sclerosis without significant stenosis. EF 63%  -- CCTA 10/28/22: Ca 660, mod-severe OM1, probably nonobstructive RPDA, nonobstructive remaining segments  -- Cardiac cath cancelled 10/31 2/2 new fever (see below)  -- Pt is s/p cardiac cath 11/1/22 w/ Dr. Hoover: CHEIKH OM1 (80%), EDP 12 mmHg, R radial access  - Continue Aspirin 81mg daily, Plavix 75mg daily, Lipitor increased from 20mg to 40mg daily

## 2022-11-02 ENCOUNTER — TRANSCRIPTION ENCOUNTER (OUTPATIENT)
Age: 82
End: 2022-11-02

## 2022-11-02 VITALS
OXYGEN SATURATION: 97 % | SYSTOLIC BLOOD PRESSURE: 147 MMHG | DIASTOLIC BLOOD PRESSURE: 79 MMHG | HEART RATE: 88 BPM | RESPIRATION RATE: 18 BRPM

## 2022-11-02 LAB
ANION GAP SERPL CALC-SCNC: 13 MMOL/L — SIGNIFICANT CHANGE UP (ref 5–17)
BUN SERPL-MCNC: 7 MG/DL — SIGNIFICANT CHANGE UP (ref 7–23)
CALCIUM SERPL-MCNC: 9.7 MG/DL — SIGNIFICANT CHANGE UP (ref 8.4–10.5)
CHLORIDE SERPL-SCNC: 92 MMOL/L — LOW (ref 96–108)
CO2 SERPL-SCNC: 27 MMOL/L — SIGNIFICANT CHANGE UP (ref 22–31)
CREAT SERPL-MCNC: 0.43 MG/DL — LOW (ref 0.5–1.3)
EGFR: 98 ML/MIN/1.73M2 — SIGNIFICANT CHANGE UP
GLUCOSE BLDC GLUCOMTR-MCNC: 203 MG/DL — HIGH (ref 70–99)
GLUCOSE BLDC GLUCOMTR-MCNC: 244 MG/DL — HIGH (ref 70–99)
GLUCOSE SERPL-MCNC: 186 MG/DL — HIGH (ref 70–99)
HCT VFR BLD CALC: 34.7 % — SIGNIFICANT CHANGE UP (ref 34.5–45)
HGB BLD-MCNC: 11.8 G/DL — SIGNIFICANT CHANGE UP (ref 11.5–15.5)
ISTAT ACTK (ACTIVATED CLOTTING TIME KAOLIN): 277 SEC — HIGH (ref 74–137)
MAGNESIUM SERPL-MCNC: 1.7 MG/DL — SIGNIFICANT CHANGE UP (ref 1.6–2.6)
MCHC RBC-ENTMCNC: 28.3 PG — SIGNIFICANT CHANGE UP (ref 27–34)
MCHC RBC-ENTMCNC: 34 GM/DL — SIGNIFICANT CHANGE UP (ref 32–36)
MCV RBC AUTO: 83.2 FL — SIGNIFICANT CHANGE UP (ref 80–100)
NRBC # BLD: 0 /100 WBCS — SIGNIFICANT CHANGE UP (ref 0–0)
PLATELET # BLD AUTO: 304 K/UL — SIGNIFICANT CHANGE UP (ref 150–400)
POTASSIUM SERPL-MCNC: 4.1 MMOL/L — SIGNIFICANT CHANGE UP (ref 3.5–5.3)
POTASSIUM SERPL-SCNC: 4.1 MMOL/L — SIGNIFICANT CHANGE UP (ref 3.5–5.3)
RBC # BLD: 4.17 M/UL — SIGNIFICANT CHANGE UP (ref 3.8–5.2)
RBC # FLD: 14.1 % — SIGNIFICANT CHANGE UP (ref 10.3–14.5)
SODIUM SERPL-SCNC: 132 MMOL/L — LOW (ref 135–145)
WBC # BLD: 10.95 K/UL — HIGH (ref 3.8–10.5)
WBC # FLD AUTO: 10.95 K/UL — HIGH (ref 3.8–10.5)

## 2022-11-02 PROCEDURE — C1725: CPT

## 2022-11-02 PROCEDURE — 83880 ASSAY OF NATRIURETIC PEPTIDE: CPT

## 2022-11-02 PROCEDURE — 80048 BASIC METABOLIC PNL TOTAL CA: CPT

## 2022-11-02 PROCEDURE — 99285 EMERGENCY DEPT VISIT HI MDM: CPT

## 2022-11-02 PROCEDURE — 82962 GLUCOSE BLOOD TEST: CPT

## 2022-11-02 PROCEDURE — 83605 ASSAY OF LACTIC ACID: CPT

## 2022-11-02 PROCEDURE — 82550 ASSAY OF CK (CPK): CPT

## 2022-11-02 PROCEDURE — 81003 URINALYSIS AUTO W/O SCOPE: CPT

## 2022-11-02 PROCEDURE — C1874: CPT

## 2022-11-02 PROCEDURE — 80053 COMPREHEN METABOLIC PANEL: CPT

## 2022-11-02 PROCEDURE — C1894: CPT

## 2022-11-02 PROCEDURE — 87040 BLOOD CULTURE FOR BACTERIA: CPT

## 2022-11-02 PROCEDURE — 85652 RBC SED RATE AUTOMATED: CPT

## 2022-11-02 PROCEDURE — 36415 COLL VENOUS BLD VENIPUNCTURE: CPT

## 2022-11-02 PROCEDURE — C1769: CPT

## 2022-11-02 PROCEDURE — 85610 PROTHROMBIN TIME: CPT

## 2022-11-02 PROCEDURE — 85347 COAGULATION TIME ACTIVATED: CPT

## 2022-11-02 PROCEDURE — 75574 CT ANGIO HRT W/3D IMAGE: CPT

## 2022-11-02 PROCEDURE — 97116 GAIT TRAINING THERAPY: CPT

## 2022-11-02 PROCEDURE — 85379 FIBRIN DEGRADATION QUANT: CPT

## 2022-11-02 PROCEDURE — 86140 C-REACTIVE PROTEIN: CPT

## 2022-11-02 PROCEDURE — 83735 ASSAY OF MAGNESIUM: CPT

## 2022-11-02 PROCEDURE — 84484 ASSAY OF TROPONIN QUANT: CPT

## 2022-11-02 PROCEDURE — 85027 COMPLETE CBC AUTOMATED: CPT

## 2022-11-02 PROCEDURE — 85025 COMPLETE CBC W/AUTO DIFF WBC: CPT

## 2022-11-02 PROCEDURE — 97161 PT EVAL LOW COMPLEX 20 MIN: CPT

## 2022-11-02 PROCEDURE — 80061 LIPID PANEL: CPT

## 2022-11-02 PROCEDURE — 71045 X-RAY EXAM CHEST 1 VIEW: CPT

## 2022-11-02 PROCEDURE — 82553 CREATINE MB FRACTION: CPT

## 2022-11-02 PROCEDURE — 82248 BILIRUBIN DIRECT: CPT

## 2022-11-02 PROCEDURE — 93306 TTE W/DOPPLER COMPLETE: CPT

## 2022-11-02 PROCEDURE — 83036 HEMOGLOBIN GLYCOSYLATED A1C: CPT

## 2022-11-02 PROCEDURE — C1887: CPT

## 2022-11-02 PROCEDURE — 87635 SARS-COV-2 COVID-19 AMP PRB: CPT

## 2022-11-02 PROCEDURE — 99239 HOSP IP/OBS DSCHRG MGMT >30: CPT

## 2022-11-02 PROCEDURE — 93970 EXTREMITY STUDY: CPT

## 2022-11-02 PROCEDURE — 84443 ASSAY THYROID STIM HORMONE: CPT

## 2022-11-02 PROCEDURE — 85730 THROMBOPLASTIN TIME PARTIAL: CPT

## 2022-11-02 RX ORDER — ATORVASTATIN CALCIUM 80 MG/1
1 TABLET, FILM COATED ORAL
Qty: 0 | Refills: 0 | DISCHARGE

## 2022-11-02 RX ORDER — AMLODIPINE BESYLATE 2.5 MG/1
1 TABLET ORAL
Qty: 30 | Refills: 2
Start: 2022-11-02 | End: 2023-01-30

## 2022-11-02 RX ORDER — ACETAMINOPHEN 500 MG
975 TABLET ORAL ONCE
Refills: 0 | Status: COMPLETED | OUTPATIENT
Start: 2022-11-02 | End: 2022-11-02

## 2022-11-02 RX ORDER — LOSARTAN POTASSIUM 100 MG/1
1 TABLET, FILM COATED ORAL
Qty: 0 | Refills: 0 | DISCHARGE
Start: 2022-11-02

## 2022-11-02 RX ORDER — AMLODIPINE BESYLATE 2.5 MG/1
1 TABLET ORAL
Qty: 0 | Refills: 0 | DISCHARGE

## 2022-11-02 RX ORDER — CLOPIDOGREL BISULFATE 75 MG/1
1 TABLET, FILM COATED ORAL
Qty: 30 | Refills: 11
Start: 2022-11-02 | End: 2023-10-27

## 2022-11-02 RX ORDER — ATORVASTATIN CALCIUM 80 MG/1
1 TABLET, FILM COATED ORAL
Qty: 0 | Refills: 0 | DISCHARGE
Start: 2022-11-02

## 2022-11-02 RX ORDER — MAGNESIUM OXIDE 400 MG ORAL TABLET 241.3 MG
400 TABLET ORAL ONCE
Refills: 0 | Status: COMPLETED | OUTPATIENT
Start: 2022-11-02 | End: 2022-11-02

## 2022-11-02 RX ORDER — ASPIRIN/CALCIUM CARB/MAGNESIUM 324 MG
1 TABLET ORAL
Qty: 30 | Refills: 11
Start: 2022-11-02 | End: 2023-10-27

## 2022-11-02 RX ORDER — METOPROLOL TARTRATE 50 MG
1 TABLET ORAL
Qty: 0 | Refills: 0 | DISCHARGE
Start: 2022-11-02

## 2022-11-02 RX ORDER — METOPROLOL TARTRATE 50 MG
25 TABLET ORAL ONCE
Refills: 0 | Status: COMPLETED | OUTPATIENT
Start: 2022-11-02 | End: 2022-11-02

## 2022-11-02 RX ORDER — METOPROLOL TARTRATE 50 MG
1 TABLET ORAL
Qty: 0 | Refills: 0 | DISCHARGE

## 2022-11-02 RX ORDER — LOSARTAN POTASSIUM 100 MG/1
1 TABLET, FILM COATED ORAL
Qty: 0 | Refills: 0 | DISCHARGE

## 2022-11-02 RX ORDER — AMLODIPINE BESYLATE 2.5 MG/1
5 TABLET ORAL ONCE
Refills: 0 | Status: COMPLETED | OUTPATIENT
Start: 2022-11-02 | End: 2022-11-02

## 2022-11-02 RX ADMIN — AMLODIPINE BESYLATE 5 MILLIGRAM(S): 2.5 TABLET ORAL at 01:41

## 2022-11-02 RX ADMIN — ENOXAPARIN SODIUM 40 MILLIGRAM(S): 100 INJECTION SUBCUTANEOUS at 13:06

## 2022-11-02 RX ADMIN — Medication 4: at 13:05

## 2022-11-02 RX ADMIN — Medication 25 MILLIGRAM(S): at 05:36

## 2022-11-02 RX ADMIN — LOSARTAN POTASSIUM 100 MILLIGRAM(S): 100 TABLET, FILM COATED ORAL at 05:36

## 2022-11-02 RX ADMIN — Medication 4: at 07:22

## 2022-11-02 RX ADMIN — Medication 81 MILLIGRAM(S): at 13:06

## 2022-11-02 RX ADMIN — CLOPIDOGREL BISULFATE 75 MILLIGRAM(S): 75 TABLET, FILM COATED ORAL at 13:06

## 2022-11-02 RX ADMIN — AMLODIPINE BESYLATE 5 MILLIGRAM(S): 2.5 TABLET ORAL at 05:36

## 2022-11-02 RX ADMIN — MAGNESIUM OXIDE 400 MG ORAL TABLET 400 MILLIGRAM(S): 241.3 TABLET ORAL at 13:07

## 2022-11-02 RX ADMIN — Medication 25 MILLIGRAM(S): at 00:30

## 2022-11-02 RX ADMIN — Medication 975 MILLIGRAM(S): at 13:07

## 2022-11-02 NOTE — CHART NOTE - NSCHARTNOTEFT_GEN_A_CORE
Removal of Radial Band    Pulses in the left upper extremity are palpable. The patient was placed in the supine position. The insertion site was identified and the band deflated per protocol. The radial band was removed slowly. Direct pressure was applied for 5 minutes     Complications: Small hematoma pressed out for 5 min, patient had two radial bands on Removal of Radial Band  11/1 @ 9:45  Site Examined clean dry no oozing or bleed noted TR band inflated, pulses in the right wrist are palpable. The patient was placed in the supine position. The insertion site was identified and the band deflated per protocol. The radial band was removed slowly and no bleeding was noticed.      Complications: NO complications no bleeding or hematoma noted after band removed

## 2022-11-02 NOTE — DISCHARGE NOTE NURSING/CASE MANAGEMENT/SOCIAL WORK - PATIENT PORTAL LINK FT
You can access the FollowMyHealth Patient Portal offered by [facility] by registering at the following website: [email or web address]. By joining [employer]’s FollowMyHealth portal, you will also be able to view your health information using other applications (apps) compatible with our system.

## 2022-11-05 LAB
CULTURE RESULTS: SIGNIFICANT CHANGE UP
CULTURE RESULTS: SIGNIFICANT CHANGE UP
SPECIMEN SOURCE: SIGNIFICANT CHANGE UP
SPECIMEN SOURCE: SIGNIFICANT CHANGE UP

## 2022-11-10 DIAGNOSIS — M79.89 OTHER SPECIFIED SOFT TISSUE DISORDERS: ICD-10-CM

## 2022-11-10 DIAGNOSIS — E78.5 HYPERLIPIDEMIA, UNSPECIFIED: ICD-10-CM

## 2022-11-10 DIAGNOSIS — E11.42 TYPE 2 DIABETES MELLITUS WITH DIABETIC POLYNEUROPATHY: ICD-10-CM

## 2022-11-10 DIAGNOSIS — I25.110 ATHEROSCLEROTIC HEART DISEASE OF NATIVE CORONARY ARTERY WITH UNSTABLE ANGINA PECTORIS: ICD-10-CM

## 2022-11-10 DIAGNOSIS — I45.10 UNSPECIFIED RIGHT BUNDLE-BRANCH BLOCK: ICD-10-CM

## 2022-11-10 DIAGNOSIS — E87.1 HYPO-OSMOLALITY AND HYPONATREMIA: ICD-10-CM

## 2022-11-10 DIAGNOSIS — I10 ESSENTIAL (PRIMARY) HYPERTENSION: ICD-10-CM

## 2022-11-10 DIAGNOSIS — I80.8 PHLEBITIS AND THROMBOPHLEBITIS OF OTHER SITES: ICD-10-CM
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
## 2022-11-10 DIAGNOSIS — Z79.84 LONG TERM (CURRENT) USE OF ORAL HYPOGLYCEMIC DRUGS: ICD-10-CM

## 2022-11-10 DIAGNOSIS — I25.84 CORONARY ATHEROSCLEROSIS DUE TO CALCIFIED CORONARY LESION: ICD-10-CM

## 2022-11-10 DIAGNOSIS — T80.1XXA VASCULAR COMPLICATIONS FOLLOWING INFUSION, TRANSFUSION AND THERAPEUTIC INJECTION, INITIAL ENCOUNTER: ICD-10-CM

## 2022-11-10 DIAGNOSIS — E83.42 HYPOMAGNESEMIA: ICD-10-CM

## 2023-01-17 PROBLEM — E11.9 TYPE 2 DIABETES MELLITUS WITHOUT COMPLICATIONS: Chronic | Status: ACTIVE | Noted: 2022-10-27

## 2023-01-17 PROBLEM — E78.5 HYPERLIPIDEMIA, UNSPECIFIED: Chronic | Status: ACTIVE | Noted: 2022-10-27

## 2023-01-17 PROBLEM — I10 ESSENTIAL (PRIMARY) HYPERTENSION: Chronic | Status: ACTIVE | Noted: 2022-10-27

## 2023-01-17 PROBLEM — Z86.69 PERSONAL HISTORY OF OTHER DISEASES OF THE NERVOUS SYSTEM AND SENSE ORGANS: Chronic | Status: ACTIVE | Noted: 2022-10-27

## 2023-01-31 ENCOUNTER — APPOINTMENT (OUTPATIENT)
Dept: HEART AND VASCULAR | Facility: CLINIC | Age: 83
End: 2023-01-31

## 2023-03-21 ENCOUNTER — APPOINTMENT (OUTPATIENT)
Dept: HEART AND VASCULAR | Facility: CLINIC | Age: 83
End: 2023-03-21

## 2023-04-04 ENCOUNTER — APPOINTMENT (OUTPATIENT)
Dept: HEART AND VASCULAR | Facility: CLINIC | Age: 83
End: 2023-04-04
Payer: MEDICARE

## 2023-04-04 VITALS
OXYGEN SATURATION: 97 % | SYSTOLIC BLOOD PRESSURE: 155 MMHG | HEART RATE: 95 BPM | TEMPERATURE: 96.6 F | HEIGHT: 62 IN | WEIGHT: 160 LBS | BODY MASS INDEX: 29.44 KG/M2 | DIASTOLIC BLOOD PRESSURE: 66 MMHG

## 2023-04-04 DIAGNOSIS — E78.5 HYPERLIPIDEMIA, UNSPECIFIED: ICD-10-CM

## 2023-04-04 DIAGNOSIS — G89.29 PAIN IN RIGHT KNEE: ICD-10-CM

## 2023-04-04 DIAGNOSIS — E11.9 TYPE 2 DIABETES MELLITUS W/OUT COMPLICATIONS: ICD-10-CM

## 2023-04-04 DIAGNOSIS — I25.10 ATHEROSCLEROTIC HEART DISEASE OF NATIVE CORONARY ARTERY W/OUT ANGINA PECTORIS: ICD-10-CM

## 2023-04-04 DIAGNOSIS — E55.9 VITAMIN D DEFICIENCY, UNSPECIFIED: ICD-10-CM

## 2023-04-04 DIAGNOSIS — I10 ESSENTIAL (PRIMARY) HYPERTENSION: ICD-10-CM

## 2023-04-04 DIAGNOSIS — M25.562 PAIN IN RIGHT KNEE: ICD-10-CM

## 2023-04-04 DIAGNOSIS — M25.561 PAIN IN RIGHT KNEE: ICD-10-CM

## 2023-04-04 PROCEDURE — 93000 ELECTROCARDIOGRAM COMPLETE: CPT

## 2023-04-04 PROCEDURE — 99214 OFFICE O/P EST MOD 30 MIN: CPT

## 2023-04-04 RX ORDER — AMLODIPINE AND OLMESARTAN MEDOXOMIL 10; 20 MG/1; MG/1
10-20 TABLET ORAL
Refills: 0 | Status: DISCONTINUED | COMMUNITY
End: 2023-04-04

## 2023-04-04 RX ORDER — BLOOD PRESSURE TEST KIT-LARGE
KIT MISCELLANEOUS
Qty: 1 | Refills: 0 | Status: ACTIVE | COMMUNITY
Start: 2023-04-04 | End: 1900-01-01

## 2023-04-04 RX ORDER — AMLODIPINE BESYLATE 10 MG/1
10 TABLET ORAL
Refills: 0 | Status: ACTIVE | COMMUNITY

## 2023-04-04 RX ORDER — LOSARTAN POTASSIUM 100 MG/1
100 TABLET, FILM COATED ORAL
Refills: 0 | Status: ACTIVE | COMMUNITY

## 2023-04-04 RX ORDER — METFORMIN HYDROCHLORIDE 625 MG/1
TABLET ORAL
Refills: 0 | Status: ACTIVE | COMMUNITY

## 2023-04-04 RX ORDER — GLIMEPIRIDE 2 MG/1
2 TABLET ORAL
Refills: 0 | Status: ACTIVE | COMMUNITY

## 2023-04-04 RX ORDER — CLOPIDOGREL BISULFATE 75 MG/1
75 TABLET, FILM COATED ORAL
Refills: 0 | Status: ACTIVE | COMMUNITY

## 2023-04-04 RX ORDER — ERGOCALCIFEROL (VITAMIN D2) 1250 MCG
50000 CAPSULE ORAL
Refills: 0 | Status: ACTIVE | COMMUNITY

## 2023-04-04 RX ORDER — HYDROCHLOROTHIAZIDE 25 MG/1
25 TABLET ORAL DAILY
Qty: 30 | Refills: 6 | Status: ACTIVE | COMMUNITY
Start: 1900-01-01 | End: 1900-01-01

## 2023-04-04 RX ORDER — ATORVASTATIN CALCIUM 40 MG/1
40 TABLET, FILM COATED ORAL
Refills: 0 | Status: ACTIVE | COMMUNITY

## 2023-04-05 ENCOUNTER — NON-APPOINTMENT (OUTPATIENT)
Age: 83
End: 2023-04-05

## 2023-04-05 NOTE — DISCUSSION/SUMMARY
[FreeTextEntry1] : 83 yo woman with history of OA of both knees, HTN, DM2, CAD s/p PCI to OM1 on 11/1/22, is here to re-establish cardiology care. \par \par CAD \par Continue ASA/Plaix until November 2023, after which plavix can be discontinued and ASA 81 mg qd can be continued lifelong\par \par HYPERTENSION\par Slightly elevated\par Will increase HCTZ to 25 mg qd\par Cont Amlodipine 10 mg qd and Losartan 100 mg qd\par Check CMP \par Pt was encouraged to keep BP log\par Rx for BP monitor given\par HYPERLIPIDEMIA \par LDL goal < 70\par Cont Lipitor 40 mg qhs\par Re-check lipid panel\par \par DIABETES: A1c at goal, 6.7\par Cont Metformin and glimepiride\par Re-check A1c level\par \par B/L KNEE OSTEOARTHRITIS\par Elective surgery - pt is optimized to undergo L knee replacement ONLY on DUAL ANTIPLATELET agents (ASA and Plavix) if performed before November 2023. After November 2023, pt can undergo the surgery on ASA 81 mg qd only. Antiplatelet therapy should not be discontinued completely for the surgery.

## 2023-04-05 NOTE — CARDIOLOGY SUMMARY
[de-identified] : 4/3/23: BRENDON ALBA\par 10/26/22: EDILIA ALBA RBBB [de-identified] : 10/27/22: Normal biventricular function, AVS, no pulm HTN, EF 65-70% [de-identified] : 10/28/23: Ca score 660, severe OM1 dz [de-identified] : 11.1.22: OM1 severe dz; mild diffuse dz in LAd, D1, Lcx, and RCA

## 2023-04-05 NOTE — HISTORY OF PRESENT ILLNESS
[FreeTextEntry1] : 81 yo woman with history of OA of both knees, HTN, DM2, CAD s/p PCI to OM1 on 11/1/22, is here to re-establish cardiology care. \par No chest pain. No shortness of breath. No dyspnea. Unlimited exercise tolerance. No orthopnea. No PND.\par \par She is having a lot of trouble ambulating and walks with a walker and cane. She has osteoarthritis of both knees and would like to get L knee replacement soon. \par \par \par Current meds:\par Plavid 75 mg qd\par ASA 81 qd\par Losartan 100 mg qd\par Metformin 850 mg\par Glimepiride 2 mg \par HCTZ 12.5 mg qd\par Amlodipine 10 mg qd\par Vitamin D

## 2024-04-05 NOTE — PATIENT PROFILE ADULT - FUNCTIONAL ASSESSMENT - BASIC MOBILITY 6.
The patient has received a copy of the Provation Report the doctor has written and was discussed with the patient and given to primary RN prior to returning to the inpatient floor.  All questions were answered and patient has provider's office phone number for further questions or concerns.   
3 = A little assistance

## 2024-05-06 ENCOUNTER — RX RENEWAL (OUTPATIENT)
Age: 84
End: 2024-05-06

## 2024-05-06 RX ORDER — METOPROLOL SUCCINATE 25 MG/1
25 TABLET, EXTENDED RELEASE ORAL DAILY
Qty: 90 | Refills: 0 | Status: ACTIVE | COMMUNITY
Start: 1900-01-01 | End: 1900-01-01

## 2025-01-05 NOTE — ED ADULT TRIAGE NOTE - TEMPERATURE IN CELSIUS (DEGREES C)
36.8 You can access the FollowMyHealth Patient Portal offered by [facility] by registering at the following website: [email or web address]. By joining [employer]’s FollowMyHealth portal, you will also be able to view your health information using other applications (apps) compatible with our system.

## 2025-01-27 ENCOUNTER — RX RENEWAL (OUTPATIENT)
Age: 85
End: 2025-01-27

## 2025-07-17 ENCOUNTER — EMERGENCY (EMERGENCY)
Facility: HOSPITAL | Age: 85
LOS: 1 days | End: 2025-07-17
Attending: EMERGENCY MEDICINE | Admitting: STUDENT IN AN ORGANIZED HEALTH CARE EDUCATION/TRAINING PROGRAM
Payer: MEDICARE

## 2025-07-17 VITALS
HEIGHT: 62 IN | TEMPERATURE: 98 F | RESPIRATION RATE: 16 BRPM | DIASTOLIC BLOOD PRESSURE: 65 MMHG | WEIGHT: 169.98 LBS | SYSTOLIC BLOOD PRESSURE: 192 MMHG | HEART RATE: 95 BPM | OXYGEN SATURATION: 97 %

## 2025-07-17 VITALS — SYSTOLIC BLOOD PRESSURE: 188 MMHG | DIASTOLIC BLOOD PRESSURE: 87 MMHG

## 2025-07-17 LAB
ANION GAP SERPL CALC-SCNC: 16 MMOL/L — SIGNIFICANT CHANGE UP (ref 5–17)
BASOPHILS # BLD AUTO: 0.04 K/UL — SIGNIFICANT CHANGE UP (ref 0–0.2)
BASOPHILS NFR BLD AUTO: 0.4 % — SIGNIFICANT CHANGE UP (ref 0–2)
BUN SERPL-MCNC: 10 MG/DL — SIGNIFICANT CHANGE UP (ref 7–23)
CALCIUM SERPL-MCNC: 10.1 MG/DL — SIGNIFICANT CHANGE UP (ref 8.4–10.5)
CHLORIDE SERPL-SCNC: 91 MMOL/L — LOW (ref 96–108)
CO2 SERPL-SCNC: 24 MMOL/L — SIGNIFICANT CHANGE UP (ref 22–31)
CREAT SERPL-MCNC: 0.59 MG/DL — SIGNIFICANT CHANGE UP (ref 0.5–1.3)
EGFR: 89 ML/MIN/1.73M2 — SIGNIFICANT CHANGE UP
EGFR: 89 ML/MIN/1.73M2 — SIGNIFICANT CHANGE UP
EOSINOPHIL # BLD AUTO: 0.03 K/UL — SIGNIFICANT CHANGE UP (ref 0–0.5)
EOSINOPHIL NFR BLD AUTO: 0.3 % — SIGNIFICANT CHANGE UP (ref 0–6)
GLUCOSE SERPL-MCNC: 134 MG/DL — HIGH (ref 70–99)
HCT VFR BLD CALC: 35.3 % — SIGNIFICANT CHANGE UP (ref 34.5–45)
HGB BLD-MCNC: 12.5 G/DL — SIGNIFICANT CHANGE UP (ref 11.5–15.5)
IMM GRANULOCYTES # BLD AUTO: 0.13 K/UL — HIGH (ref 0–0.07)
IMM GRANULOCYTES NFR BLD AUTO: 1.2 % — HIGH (ref 0–0.9)
LYMPHOCYTES # BLD AUTO: 3.09 K/UL — SIGNIFICANT CHANGE UP (ref 1–3.3)
LYMPHOCYTES NFR BLD AUTO: 28.9 % — SIGNIFICANT CHANGE UP (ref 13–44)
MCHC RBC-ENTMCNC: 30.1 PG — SIGNIFICANT CHANGE UP (ref 27–34)
MCHC RBC-ENTMCNC: 35.4 G/DL — SIGNIFICANT CHANGE UP (ref 32–36)
MCV RBC AUTO: 85.1 FL — SIGNIFICANT CHANGE UP (ref 80–100)
MONOCYTES # BLD AUTO: 0.53 K/UL — SIGNIFICANT CHANGE UP (ref 0–0.9)
MONOCYTES NFR BLD AUTO: 5 % — SIGNIFICANT CHANGE UP (ref 2–14)
NEUTROPHILS # BLD AUTO: 6.86 K/UL — SIGNIFICANT CHANGE UP (ref 1.8–7.4)
NEUTROPHILS NFR BLD AUTO: 64.2 % — SIGNIFICANT CHANGE UP (ref 43–77)
NRBC # BLD AUTO: 0 K/UL — SIGNIFICANT CHANGE UP (ref 0–0)
NRBC # FLD: 0 K/UL — SIGNIFICANT CHANGE UP (ref 0–0)
NRBC BLD AUTO-RTO: 0 /100 WBCS — SIGNIFICANT CHANGE UP (ref 0–0)
PLATELET # BLD AUTO: 243 K/UL — SIGNIFICANT CHANGE UP (ref 150–400)
PMV BLD: 11.1 FL — SIGNIFICANT CHANGE UP (ref 7–13)
POTASSIUM SERPL-MCNC: 3.7 MMOL/L — SIGNIFICANT CHANGE UP (ref 3.5–5.3)
POTASSIUM SERPL-SCNC: 3.7 MMOL/L — SIGNIFICANT CHANGE UP (ref 3.5–5.3)
RBC # BLD: 4.15 M/UL — SIGNIFICANT CHANGE UP (ref 3.8–5.2)
RBC # FLD: 12.9 % — SIGNIFICANT CHANGE UP (ref 10.3–14.5)
SODIUM SERPL-SCNC: 131 MMOL/L — LOW (ref 135–145)
TROPONIN T, HIGH SENSITIVITY RESULT: 13 NG/L — SIGNIFICANT CHANGE UP (ref 0–51)
WBC # BLD: 10.68 K/UL — HIGH (ref 3.8–10.5)
WBC # FLD AUTO: 10.68 K/UL — HIGH (ref 3.8–10.5)

## 2025-07-17 PROCEDURE — 71045 X-RAY EXAM CHEST 1 VIEW: CPT | Mod: 26

## 2025-07-17 PROCEDURE — 99285 EMERGENCY DEPT VISIT HI MDM: CPT

## 2025-07-17 PROCEDURE — 93010 ELECTROCARDIOGRAM REPORT: CPT

## 2025-07-17 RX ORDER — AMLODIPINE BESYLATE 10 MG/1
5 TABLET ORAL ONCE
Refills: 0 | Status: COMPLETED | OUTPATIENT
Start: 2025-07-17 | End: 2025-07-17

## 2025-07-17 RX ADMIN — AMLODIPINE BESYLATE 5 MILLIGRAM(S): 10 TABLET ORAL at 23:40

## 2025-07-17 NOTE — ED ADULT NURSE NOTE - NSICDXPASTMEDICALHX_GEN_ALL_CORE_FT
PAST MEDICAL HISTORY:  DM2 (diabetes mellitus, type 2)     Essential hypertension     H/O peripheral neuropathy     Hyperlipidemia

## 2025-07-17 NOTE — ED ADULT NURSE NOTE - NSFALLRISKINTERV_ED_ALL_ED
Assistance OOB with selected safe patient handling equipment if applicable/Assistance with ambulation/Communicate fall risk and risk factors to all staff, patient, and family/Monitor gait and stability/Provide visual cue: yellow wristband, yellow gown, etc/Reinforce activity limits and safety measures with patient and family/Call bell, personal items and telephone in reach/Instruct patient to call for assistance before getting out of bed/chair/stretcher/Non-slip footwear applied when patient is off stretcher/[place] to call system/Physically safe environment - no spills, clutter or unnecessary equipment/Purposeful Proactive Rounding/Room/bathroom lighting operational, light cord in reach Assistance OOB with selected safe patient handling equipment if applicable/Assistance with ambulation/Communicate fall risk and risk factors to all staff, patient, and family/Monitor gait and stability/Provide patient with walking aids/Provide visual cue: yellow wristband, yellow gown, etc/Reinforce activity limits and safety measures with patient and family/Call bell, personal items and telephone in reach/Instruct patient to call for assistance before getting out of bed/chair/stretcher/Non-slip footwear applied when patient is off stretcher/[place] to call system/Physically safe environment - no spills, clutter or unnecessary equipment/Purposeful Proactive Rounding/Room/bathroom lighting operational, light cord in reach

## 2025-07-17 NOTE — ED PROVIDER NOTE - PATIENT PORTAL LINK FT
You can access the FollowMyHealth Patient Portal offered by [facility] by registering at the following website: [email or web address]. By joining [employer]’s FollowMyHealth portal, you will also be able to view your health information using other applications (apps) compatible with our system.

## 2025-07-17 NOTE — ED ADULT NURSE NOTE - OBJECTIVE STATEMENT
Pt AOx4, respirations even and unlabored.   Pt p/w chest pain and pressure, as well as new leg swelling. Pt denies becoming diaphoretic, but endorses pain started this morning and has lasted all day.   Pt denies SOB, dizziness, headache, visual changes, tingling, nausea, vomiting, diarrhea.   Pt undressed and placed in hospital gown.  IV access and blood labs obtained.

## 2025-07-17 NOTE — ED PROVIDER NOTE - NSFOLLOWUPINSTRUCTIONS_ED_ALL_ED_FT
Thank you for visiting [facility] Emergency Department.      Please   follow up with your cardiologist as scheduled in a few days for further evaluation and treatment.   Please bring copies of all discharge papers and results and show to your primary care doctor.      Please continue taking all previous medications as instructed     I appreciated your patience and hope you feel better soon.

## 2025-07-17 NOTE — ED ADULT TRIAGE NOTE - IDEAL BODY WEIGHT(KG)
50 Dutasteride Pregnancy And Lactation Text: This medication is absolutely contraindicated in women, especially during pregnancy and breast feeding. Feminization of male fetuses is possible if taking while pregnant.

## 2025-07-17 NOTE — ED PROVIDER NOTE - OBJECTIVE STATEMENT
Pt w/ PMHx HTN, HLD, DM2,and  Peripheral Neuropathy, CAD s/p PCI, leukemia in remission, p/w CP, onset 6 pm tonight. Pain is R sided, non radiating. Denies diaphoresis, n/v, SOB. She reports gen weakness / fatigue. Sx occur at rest and intermittent. Sx onset s/p hearing someone in her building . She is also c/o ongoing RLE intermittent pain that radiates up and down her leg since her knee surgeries, R > L (c/o below in ) as well. No LE edema, calf pain, cough, fever, hemoptysis, hx DVT / PE. BP noted to be high, reports compliance w/ antihypertensives: Losartan 100 mg QD, Metoprolol succinate XL 25 mg QD, Amlodipine 5 mg QD    She has been seen multiple times at [facility] for similar sx. On review of her Epic [place], seen there  w/ neg trops; 7/3 w/ neg trops; Echo 25: LVH, LVEF 65%.  trop [id number]. She has f/u w/ her [facility] cardiologist 1 week ago and is scheduled for CCTA on .     She was admitted here at [facility] for similar sx in 10/2022, per discharge summary:  "intermittent, non exertional, non radiating, midsternal chest pressure  associated with weakness, lightheadedness, SOB and palpitations that began around  5PM.  Pt. also endorses R>L LE edema with right leg "heaviness" with mild calf pain for one month. "  "[facility] and underwent ischemic workup with CCTA 10/28 showing The calcium score is severe at 660 Agatston units, which is at the 84 percentile, adjusted for age, gender and race. 2. Moderate to severe OM1 stenosis due to calcific and non-calcific plaque 3. Probably nonobstructive RPDA showing Nonobstructive coronary artery disease in remaining segments. ECHO was done 10/27 LVEF Normal right ventricular size and systolic function. Aortic sclerosis without significant stenosis.  No evidence of pulmonary hypertension. pericardial effusion No prior echo is available for comparison. On 10/27 Patient underwent Duplex US of LE with no evidence of DVT.  Patient then was planned for cardiac cath on 10/31 however on morning of cath patient spiked a fever and full set of labs were done with BCX negative to date, WBC WNL, UA negative and with most likely cause phlebitis due to IV. Patient therefore underwent cath on 2022 with Sneha Vanessa/ IC Fellow Luigi, 1vCAD with OM1 80% --> s/p CHEIKH x 1"

## 2025-07-17 NOTE — ED PROVIDER NOTE - CLINICAL SUMMARY MEDICAL DECISION MAKING FREE TEXT BOX
Atypical, R sided CP w/ chronic b/l LE pain, R> L and frequent ED visits per daughter w/ similar sx and recent neg w/u, for CCTA on 7/21. There are no EKG changes to suggest ischemia, infarction, or pericarditis. H&P is not c/w dissection. Low suspicion PE, however given R sided pain, concomitant leg pain, will r/o PE w/ CTA PE protocol. Serial trop / EKG. BP control. DDx includes but not limited to HTNsive urgency (less likely emergency), PE, atypical pain NOS, ACS, other pathology. Dispo pending w/u and clinical status

## 2025-07-17 NOTE — ED PROVIDER NOTE - PROGRESS NOTE DETAILS
Miguel: results of CT scan discussed with pt and her daughter. No PE, multiple enlarged lymph nodes+. (pt has h/o lymphoma) Daughter states they will f/u with PCP tomorrow about CT report. and with cards on 07/21.Pt is pain free. d/c home stable

## 2025-07-17 NOTE — ED PROVIDER NOTE - PHYSICAL EXAMINATION
Constitutional: Well appearing, awake, alert, oriented to person, place, time/situation and in no apparent distress.  ENMT: Airway patent. Normal MM  Eyes: Clear bilaterally  Cardiac: Normal rate, regular rhythm.  Heart sounds S1, S2.  No murmurs, rubs or gallops. No JVD or LE edema.   Respiratory: Breaths sounds equal and clear b/l. NO W/R/R. No increased WOB, tachypnea, hypoxia, or accessory mm use. Pt speaks in full sentences.   Gastrointestinal: Abd soft, NT, ND, NABS. No guarding, rebound, or rigidity. No pulsatile abdominal masses. No organomegaly appreciated.   Musculoskeletal: Range of motion is not limited. legs symmetric in size. no calf ttp.   Neuro: Alert and oriented x 3, face symmetric and speech fluent. Strength 5/5 x 4 ext and symmetric, nml gross motor movement, nml gait. No focal deficits noted.  Skin: Skin normal color for race, warm, dry and intact. No evidence of rash.  Psych: Alert and oriented to person, place, time/situation. normal mood and affect. no apparent risk to self or others.

## 2025-07-17 NOTE — ED PROVIDER NOTE - NS ED ROS FT
Constitutional: No fever or chills.   Cardiac: No SOB or edema. See HPI  Respiratory: No cough or respiratory distress. No hemoptysis.   GI: No nausea, vomiting, diarrhea or abdominal pain.  Except as documented in the HPI, all other systems are negative.

## 2025-07-18 LAB — TROPONIN T, HIGH SENSITIVITY RESULT: 13 NG/L — SIGNIFICANT CHANGE UP (ref 0–51)

## 2025-07-18 PROCEDURE — 71045 X-RAY EXAM CHEST 1 VIEW: CPT

## 2025-07-18 PROCEDURE — 36415 COLL VENOUS BLD VENIPUNCTURE: CPT

## 2025-07-18 PROCEDURE — 71275 CT ANGIOGRAPHY CHEST: CPT | Mod: 26

## 2025-07-18 PROCEDURE — 71275 CT ANGIOGRAPHY CHEST: CPT

## 2025-07-18 PROCEDURE — 84484 ASSAY OF TROPONIN QUANT: CPT

## 2025-07-18 PROCEDURE — 93005 ELECTROCARDIOGRAM TRACING: CPT

## 2025-07-18 PROCEDURE — 99285 EMERGENCY DEPT VISIT HI MDM: CPT | Mod: 25

## 2025-07-18 PROCEDURE — 80048 BASIC METABOLIC PNL TOTAL CA: CPT

## 2025-07-18 PROCEDURE — 96374 THER/PROPH/DIAG INJ IV PUSH: CPT

## 2025-07-18 PROCEDURE — 85025 COMPLETE CBC W/AUTO DIFF WBC: CPT

## 2025-07-18 RX ORDER — ACETAMINOPHEN 500 MG/5ML
1000 LIQUID (ML) ORAL ONCE
Refills: 0 | Status: COMPLETED | OUTPATIENT
Start: 2025-07-18 | End: 2025-07-18

## 2025-07-18 RX ADMIN — Medication 400 MILLIGRAM(S): at 01:40

## 2025-07-18 NOTE — ED ADULT NURSE REASSESSMENT NOTE - NS ED NURSE REASSESS COMMENT FT1
0315 - Pt. assisted to [employer] via wheelchair. Pt. in no distress and well appearing at the time of discharge

## 2025-07-21 DIAGNOSIS — E78.5 HYPERLIPIDEMIA, UNSPECIFIED: ICD-10-CM

## 2025-07-21 DIAGNOSIS — E11.40 TYPE 2 DIABETES MELLITUS WITH DIABETIC NEUROPATHY, UNSPECIFIED: ICD-10-CM

## 2025-07-21 DIAGNOSIS — M79.604 PAIN IN RIGHT LEG: ICD-10-CM

## 2025-07-21 DIAGNOSIS — R07.89 OTHER CHEST PAIN: ICD-10-CM

## 2025-07-21 DIAGNOSIS — Z98.890 OTHER SPECIFIED POSTPROCEDURAL STATES: ICD-10-CM

## 2025-07-21 DIAGNOSIS — I45.10 UNSPECIFIED RIGHT BUNDLE-BRANCH BLOCK: ICD-10-CM

## 2025-07-21 DIAGNOSIS — R07.9 CHEST PAIN, UNSPECIFIED: ICD-10-CM

## 2025-07-21 DIAGNOSIS — I10 ESSENTIAL (PRIMARY) HYPERTENSION: ICD-10-CM

## 2025-07-21 DIAGNOSIS — I25.10 ATHEROSCLEROTIC HEART DISEASE OF NATIVE CORONARY ARTERY WITHOUT ANGINA PECTORIS: ICD-10-CM

## 2025-07-21 DIAGNOSIS — R60.0 LOCALIZED EDEMA: ICD-10-CM

## 2025-07-21 DIAGNOSIS — I70.0 ATHEROSCLEROSIS OF AORTA: ICD-10-CM
